# Patient Record
Sex: FEMALE | Race: BLACK OR AFRICAN AMERICAN | NOT HISPANIC OR LATINO | URBAN - METROPOLITAN AREA
[De-identification: names, ages, dates, MRNs, and addresses within clinical notes are randomized per-mention and may not be internally consistent; named-entity substitution may affect disease eponyms.]

---

## 2019-05-31 ENCOUNTER — OUTPATIENT (OUTPATIENT)
Dept: EMERGENCY DEPT | Facility: HOSPITAL | Age: 54
LOS: 1 days | End: 2019-05-31

## 2019-05-31 VITALS
OXYGEN SATURATION: 97 % | TEMPERATURE: 98 F | HEART RATE: 67 BPM | DIASTOLIC BLOOD PRESSURE: 82 MMHG | RESPIRATION RATE: 16 BRPM | SYSTOLIC BLOOD PRESSURE: 155 MMHG

## 2019-05-31 DIAGNOSIS — N28.89 OTHER SPECIFIED DISORDERS OF KIDNEY AND URETER: ICD-10-CM

## 2019-05-31 DIAGNOSIS — Z98.51 TUBAL LIGATION STATUS: Chronic | ICD-10-CM

## 2019-05-31 DIAGNOSIS — R91.8 OTHER NONSPECIFIC ABNORMAL FINDING OF LUNG FIELD: ICD-10-CM

## 2019-05-31 DIAGNOSIS — I82.220 ACUTE EMBOLISM AND THROMBOSIS OF INFERIOR VENA CAVA: ICD-10-CM

## 2019-05-31 DIAGNOSIS — J45.909 UNSPECIFIED ASTHMA, UNCOMPLICATED: ICD-10-CM

## 2019-05-31 DIAGNOSIS — Z01.818 ENCOUNTER FOR OTHER PREPROCEDURAL EXAMINATION: ICD-10-CM

## 2019-05-31 DIAGNOSIS — R11.2 NAUSEA WITH VOMITING, UNSPECIFIED: ICD-10-CM

## 2019-05-31 LAB
ALBUMIN SERPL ELPH-MCNC: 3.7 G/DL — SIGNIFICANT CHANGE UP (ref 3.3–5)
ALP SERPL-CCNC: 81 U/L — SIGNIFICANT CHANGE UP (ref 40–120)
ALT FLD-CCNC: < 4 U/L — LOW (ref 4–33)
ANION GAP SERPL CALC-SCNC: 10 MMO/L — SIGNIFICANT CHANGE UP (ref 7–14)
ANION GAP SERPL CALC-SCNC: 13 MMO/L — SIGNIFICANT CHANGE UP (ref 7–14)
APPEARANCE UR: CLEAR — SIGNIFICANT CHANGE UP
APTT BLD: 32.6 SEC — SIGNIFICANT CHANGE UP (ref 27.5–36.3)
AST SERPL-CCNC: 5 U/L — SIGNIFICANT CHANGE UP (ref 4–32)
BACTERIA # UR AUTO: NEGATIVE — SIGNIFICANT CHANGE UP
BASE EXCESS BLDV CALC-SCNC: 2.2 MMOL/L — SIGNIFICANT CHANGE UP
BASOPHILS # BLD AUTO: 0.04 K/UL — SIGNIFICANT CHANGE UP (ref 0–0.2)
BASOPHILS NFR BLD AUTO: 0.4 % — SIGNIFICANT CHANGE UP (ref 0–2)
BILIRUB SERPL-MCNC: 0.3 MG/DL — SIGNIFICANT CHANGE UP (ref 0.2–1.2)
BILIRUB UR-MCNC: NEGATIVE — SIGNIFICANT CHANGE UP
BLD GP AB SCN SERPL QL: NEGATIVE — SIGNIFICANT CHANGE UP
BLOOD GAS VENOUS - CREATININE: 0.78 MG/DL — SIGNIFICANT CHANGE UP (ref 0.5–1.3)
BLOOD GAS VENOUS - FIO2: 21 — SIGNIFICANT CHANGE UP
BLOOD UR QL VISUAL: NEGATIVE — SIGNIFICANT CHANGE UP
BUN SERPL-MCNC: 5 MG/DL — LOW (ref 7–23)
BUN SERPL-MCNC: 8 MG/DL — SIGNIFICANT CHANGE UP (ref 7–23)
CA-I BLD-SCNC: 1.52 MMOL/L — HIGH (ref 1.03–1.23)
CA-I BLD-SCNC: 1.57 MMOL/L — HIGH (ref 1.03–1.23)
CALCIUM SERPL-MCNC: 11.7 MG/DL — HIGH (ref 8.4–10.5)
CALCIUM SERPL-MCNC: 12.4 MG/DL — HIGH (ref 8.4–10.5)
CHLORIDE BLDV-SCNC: 110 MMOL/L — HIGH (ref 96–108)
CHLORIDE SERPL-SCNC: 102 MMOL/L — SIGNIFICANT CHANGE UP (ref 98–107)
CHLORIDE SERPL-SCNC: 107 MMOL/L — SIGNIFICANT CHANGE UP (ref 98–107)
CO2 SERPL-SCNC: 25 MMOL/L — SIGNIFICANT CHANGE UP (ref 22–31)
CO2 SERPL-SCNC: 26 MMOL/L — SIGNIFICANT CHANGE UP (ref 22–31)
COLOR SPEC: YELLOW — SIGNIFICANT CHANGE UP
CREAT SERPL-MCNC: 0.84 MG/DL — SIGNIFICANT CHANGE UP (ref 0.5–1.3)
CREAT SERPL-MCNC: 0.92 MG/DL — SIGNIFICANT CHANGE UP (ref 0.5–1.3)
EOSINOPHIL # BLD AUTO: 0.02 K/UL — SIGNIFICANT CHANGE UP (ref 0–0.5)
EOSINOPHIL NFR BLD AUTO: 0.2 % — SIGNIFICANT CHANGE UP (ref 0–6)
GAS PNL BLDV: 142 MMOL/L — SIGNIFICANT CHANGE UP (ref 136–146)
GLUCOSE BLDC GLUCOMTR-MCNC: 121 MG/DL — HIGH (ref 70–99)
GLUCOSE BLDV-MCNC: 96 MG/DL — SIGNIFICANT CHANGE UP (ref 70–99)
GLUCOSE SERPL-MCNC: 122 MG/DL — HIGH (ref 70–99)
GLUCOSE SERPL-MCNC: 97 MG/DL — SIGNIFICANT CHANGE UP (ref 70–99)
GLUCOSE UR-MCNC: NEGATIVE — SIGNIFICANT CHANGE UP
HCO3 BLDV-SCNC: 26 MMOL/L — SIGNIFICANT CHANGE UP (ref 20–27)
HCT VFR BLD CALC: 32.6 % — LOW (ref 34.5–45)
HCT VFR BLDV CALC: 34.6 % — SIGNIFICANT CHANGE UP (ref 34.5–45)
HGB BLD-MCNC: 11 G/DL — LOW (ref 11.5–15.5)
HGB BLDV-MCNC: 11.2 G/DL — LOW (ref 11.5–15.5)
HYALINE CASTS # UR AUTO: NEGATIVE — SIGNIFICANT CHANGE UP
IMM GRANULOCYTES NFR BLD AUTO: 0.3 % — SIGNIFICANT CHANGE UP (ref 0–1.5)
INR BLD: 1.14 — SIGNIFICANT CHANGE UP (ref 0.88–1.17)
KETONES UR-MCNC: NEGATIVE — SIGNIFICANT CHANGE UP
LACTATE BLDV-MCNC: 1.3 MMOL/L — SIGNIFICANT CHANGE UP (ref 0.5–2)
LEUKOCYTE ESTERASE UR-ACNC: NEGATIVE — SIGNIFICANT CHANGE UP
LIDOCAIN IGE QN: 14.2 U/L — SIGNIFICANT CHANGE UP (ref 7–60)
LYMPHOCYTES # BLD AUTO: 1.89 K/UL — SIGNIFICANT CHANGE UP (ref 1–3.3)
LYMPHOCYTES # BLD AUTO: 20.9 % — SIGNIFICANT CHANGE UP (ref 13–44)
MAGNESIUM SERPL-MCNC: 1.9 MG/DL — SIGNIFICANT CHANGE UP (ref 1.6–2.6)
MCHC RBC-ENTMCNC: 32.8 PG — SIGNIFICANT CHANGE UP (ref 27–34)
MCHC RBC-ENTMCNC: 33.7 % — SIGNIFICANT CHANGE UP (ref 32–36)
MCV RBC AUTO: 97.3 FL — SIGNIFICANT CHANGE UP (ref 80–100)
MONOCYTES # BLD AUTO: 0.62 K/UL — SIGNIFICANT CHANGE UP (ref 0–0.9)
MONOCYTES NFR BLD AUTO: 6.9 % — SIGNIFICANT CHANGE UP (ref 2–14)
NEUTROPHILS # BLD AUTO: 6.44 K/UL — SIGNIFICANT CHANGE UP (ref 1.8–7.4)
NEUTROPHILS NFR BLD AUTO: 71.3 % — SIGNIFICANT CHANGE UP (ref 43–77)
NITRITE UR-MCNC: NEGATIVE — SIGNIFICANT CHANGE UP
NRBC # FLD: 0 K/UL — SIGNIFICANT CHANGE UP (ref 0–0)
PCO2 BLDV: 38 MMHG — LOW (ref 41–51)
PH BLDV: 7.45 PH — HIGH (ref 7.32–7.43)
PH UR: 6.5 — SIGNIFICANT CHANGE UP (ref 5–8)
PLATELET # BLD AUTO: 424 K/UL — HIGH (ref 150–400)
PMV BLD: 10.2 FL — SIGNIFICANT CHANGE UP (ref 7–13)
PO2 BLDV: 64 MMHG — HIGH (ref 35–40)
POTASSIUM BLDV-SCNC: 3 MMOL/L — LOW (ref 3.4–4.5)
POTASSIUM SERPL-MCNC: 3 MMOL/L — LOW (ref 3.5–5.3)
POTASSIUM SERPL-MCNC: 3.2 MMOL/L — LOW (ref 3.5–5.3)
POTASSIUM SERPL-SCNC: 3 MMOL/L — LOW (ref 3.5–5.3)
POTASSIUM SERPL-SCNC: 3.2 MMOL/L — LOW (ref 3.5–5.3)
PROT SERPL-MCNC: 6.6 G/DL — SIGNIFICANT CHANGE UP (ref 6–8.3)
PROT UR-MCNC: 30 — SIGNIFICANT CHANGE UP
PROTHROM AB SERPL-ACNC: 13.1 SEC — SIGNIFICANT CHANGE UP (ref 9.8–13.1)
RBC # BLD: 3.35 M/UL — LOW (ref 3.8–5.2)
RBC # FLD: 14 % — SIGNIFICANT CHANGE UP (ref 10.3–14.5)
RBC CASTS # UR COMP ASSIST: SIGNIFICANT CHANGE UP (ref 0–?)
RH IG SCN BLD-IMP: POSITIVE — SIGNIFICANT CHANGE UP
SAO2 % BLDV: 93.1 % — HIGH (ref 60–85)
SODIUM SERPL-SCNC: 141 MMOL/L — SIGNIFICANT CHANGE UP (ref 135–145)
SODIUM SERPL-SCNC: 142 MMOL/L — SIGNIFICANT CHANGE UP (ref 135–145)
SP GR SPEC: 1.03 — SIGNIFICANT CHANGE UP (ref 1–1.04)
SQUAMOUS # UR AUTO: SIGNIFICANT CHANGE UP
UROBILINOGEN FLD QL: SIGNIFICANT CHANGE UP
WBC # BLD: 9.04 K/UL — SIGNIFICANT CHANGE UP (ref 3.8–10.5)
WBC # FLD AUTO: 9.04 K/UL — SIGNIFICANT CHANGE UP (ref 3.8–10.5)
WBC UR QL: SIGNIFICANT CHANGE UP (ref 0–?)

## 2019-05-31 RX ORDER — NICOTINE POLACRILEX 2 MG
1 GUM BUCCAL DAILY
Refills: 0 | Status: DISCONTINUED | OUTPATIENT
Start: 2019-05-31 | End: 2019-06-01

## 2019-05-31 RX ORDER — ONDANSETRON 8 MG/1
4 TABLET, FILM COATED ORAL EVERY 6 HOURS
Refills: 0 | Status: DISCONTINUED | OUTPATIENT
Start: 2019-05-31 | End: 2019-06-01

## 2019-05-31 RX ORDER — SODIUM CHLORIDE 9 MG/ML
1000 INJECTION, SOLUTION INTRAVENOUS
Refills: 0 | Status: DISCONTINUED | OUTPATIENT
Start: 2019-05-31 | End: 2019-06-01

## 2019-05-31 RX ORDER — ACETAMINOPHEN 500 MG
650 TABLET ORAL EVERY 6 HOURS
Refills: 0 | Status: DISCONTINUED | OUTPATIENT
Start: 2019-05-31 | End: 2019-06-01

## 2019-05-31 RX ORDER — POTASSIUM CHLORIDE 20 MEQ
10 PACKET (EA) ORAL
Refills: 0 | Status: COMPLETED | OUTPATIENT
Start: 2019-05-31 | End: 2019-05-31

## 2019-05-31 RX ORDER — HEPARIN SODIUM 5000 [USP'U]/ML
5000 INJECTION INTRAVENOUS; SUBCUTANEOUS EVERY 8 HOURS
Refills: 0 | Status: DISCONTINUED | OUTPATIENT
Start: 2019-05-31 | End: 2019-06-01

## 2019-05-31 RX ORDER — MAGNESIUM SULFATE 500 MG/ML
2 VIAL (ML) INJECTION ONCE
Refills: 0 | Status: COMPLETED | OUTPATIENT
Start: 2019-05-31 | End: 2019-05-31

## 2019-05-31 RX ORDER — ALBUTEROL 90 UG/1
2 AEROSOL, METERED ORAL EVERY 6 HOURS
Refills: 0 | Status: DISCONTINUED | OUTPATIENT
Start: 2019-05-31 | End: 2019-06-01

## 2019-05-31 RX ORDER — I.V. FAT EMULSION 20 G/100ML
6.6 EMULSION INTRAVENOUS
Qty: 16 | Refills: 0 | Status: DISCONTINUED | OUTPATIENT
Start: 2019-05-31 | End: 2019-06-01

## 2019-05-31 RX ORDER — ELECTROLYTE SOLUTION,INJ
1 VIAL (ML) INTRAVENOUS
Refills: 0 | Status: DISCONTINUED | OUTPATIENT
Start: 2019-05-31 | End: 2019-06-01

## 2019-05-31 RX ADMIN — Medication 50 GRAM(S): at 07:40

## 2019-05-31 RX ADMIN — HEPARIN SODIUM 5000 UNIT(S): 5000 INJECTION INTRAVENOUS; SUBCUTANEOUS at 06:46

## 2019-05-31 RX ADMIN — HEPARIN SODIUM 5000 UNIT(S): 5000 INJECTION INTRAVENOUS; SUBCUTANEOUS at 14:08

## 2019-05-31 RX ADMIN — Medication 100 MILLIEQUIVALENT(S): at 09:26

## 2019-05-31 RX ADMIN — Medication 100 MILLIEQUIVALENT(S): at 11:13

## 2019-05-31 RX ADMIN — Medication 1 EACH: at 17:52

## 2019-05-31 RX ADMIN — ONDANSETRON 4 MILLIGRAM(S): 8 TABLET, FILM COATED ORAL at 09:21

## 2019-05-31 RX ADMIN — I.V. FAT EMULSION 6.6 ML/HR: 20 EMULSION INTRAVENOUS at 17:53

## 2019-05-31 RX ADMIN — SODIUM CHLORIDE 75 MILLILITER(S): 9 INJECTION, SOLUTION INTRAVENOUS at 22:53

## 2019-05-31 RX ADMIN — SODIUM CHLORIDE 75 MILLILITER(S): 9 INJECTION, SOLUTION INTRAVENOUS at 06:44

## 2019-05-31 RX ADMIN — SODIUM CHLORIDE 75 MILLILITER(S): 9 INJECTION, SOLUTION INTRAVENOUS at 11:14

## 2019-05-31 RX ADMIN — SODIUM CHLORIDE 75 MILLILITER(S): 9 INJECTION, SOLUTION INTRAVENOUS at 07:39

## 2019-05-31 RX ADMIN — HEPARIN SODIUM 5000 UNIT(S): 5000 INJECTION INTRAVENOUS; SUBCUTANEOUS at 22:52

## 2019-05-31 RX ADMIN — Medication 100 MILLIEQUIVALENT(S): at 14:13

## 2019-05-31 NOTE — ED ADULT TRIAGE NOTE - TEMPERATURE IN CELSIUS (DEGREES C)
"  [facility] does not have a PEDIATRIC EMERGENCY ROOM, PEDIATRIC UNIT OR  PEDIATRIC INTENSIVE CARE UNIT.   "Your feedback is important to us. If you should receive a survey in the next few days, please share your experience with us."     " 36.8

## 2019-05-31 NOTE — H&P ADULT - NSICDXPASTMEDICALHX_GEN_ALL_CORE_FT
PAST MEDICAL HISTORY:  Asthma     [place] (hard of hearing) uses hearing aides bilaterally PAST MEDICAL HISTORY:  Asthma     [place] (hard of hearing) uses hearing aides bilaterally

## 2019-05-31 NOTE — ED PROVIDER NOTE - ATTENDING CONTRIBUTION TO CARE
Reginald: 55 yo female with a h/o asthma transferred from OSH for likely new renal malignancy with vascular involvement. Pt initially presented with abdominal pain, nausea and NBNB vomiting with unintentional recent weight loss. Pt currently feeling much better but new malignancy with mets seen on CT and so pt was transferred for urology evaluation. Pt denies dysuria, hematuria and flank pain. Exam: GENERAL: well appearing, NAD, HEENT: MMM, PERRLA, CARDIO: +S1/S2, no murmurs, rubs or gallops, LUNGS: CTA B/L, no wheezing, rales or rhonchi, MSK: No CVA TTP ABD: soft, nontender, BSx4 quadrants, no guarding or rigidity. EXT: No LE edema NEURO: AxOx3, SKIN: no rashes or lesions, well perfused A/P- 55 yo female with new renal malignancy and mets. PT currently comfortable with no complaints. abdomen non tender. will obtain labs, consult urology and likely admit.

## 2019-05-31 NOTE — CONSULT NOTE ADULT - SUBJECTIVE AND OBJECTIVE BOX
Ashvin Lucio Jr, MD  page [phone]  HPI:  This is a 54 year old female with a medical history significant for asthma, and active heavy smoking, presenting as a transfer from [facility] for findings of 9cm right renal mass.  Patient reports she has been having epigastric pain, a poor appetite, and nausea and vomiting for at least 3 months.  She reports she cannot tolerate anything po, vomits up anything she ingests, denies hematemesis.  She reports she has lost nearly 100lbs within this time period as well suggesting that she was over 250lbs prior and is now around 140lbs.  She reports weakness and fatigue as well.  She presented to [facility] for increased vomiting, epigastric pain and resultant weakness on .  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  Denies fevers/chills.  Denies hematuria, increase in urgency/ frequency, dysuria, denies retention.  Denies chest pain.  Reports occasional shortness of breath most often associated with vomiting.  Denies pain or nausea currently. (31 May 2019 04:35)    Naseous with vomiting after breakfast today.  Zofran relieved nausea and she has been drinking tea.  Pt denies chest pain.  She has recently started using her brother's albuterol inhaler after not requiring medication for asthma since age 22.  So current wheezing or SOB.     PAST MEDICAL & SURGICAL HISTORY:  [place] (hard of hearing): uses hearing aides bilaterally  Asthma  History of tubal ligation      Review of Systems:   CONSTITUTIONAL: No fever, + weight loss  EYES: No eye pain, visual disturbances, or discharge  ENMT:  No difficulty hearing, tinnitus, vertigo; No sinus or throat pain  NECK: No pain or stiffness  BREASTS: No pain, masses, or nipple discharge  RESPIRATORY: No cough, wheezing, chills or hemoptysis; No shortness of breath  CARDIOVASCULAR: No chest pain, palpitations, dizziness, or leg swelling  GASTROINTESTINAL: see HPI  GENITOURINARY: No dysuria, frequency, hematuria, or incontinence  NEUROLOGICAL: No headaches, memory loss, loss of strength, numbness, or tremors  SKIN: No itching, burning, rashes, or lesions   LYMPH NODES: No enlarged glands  ENDOCRINE: No heat or cold intolerance; No hair loss  MUSCULOSKELETAL: No joint pain or swelling; No muscle, back, or extremity pain  PSYCHIATRIC: No depression, anxiety, mood swings, or difficulty sleeping  HEME/LYMPH: No easy bruising, or bleeding gums  ALLERY AND IMMUNOLOGIC: No hives or eczema    Allergies    No Known Allergies    Intolerances        Social History: current smoker 1.5 PPD x 36y, occ etoh denies idu    FAMILY HISTORY:  FH: ovarian cancer: Aunt    MEDICATIONS  (STANDING):  heparin  Injectable 5000 Unit(s) SubCutaneous every 8 hours  lactated ringers. 1000 milliLiter(s) (75 mL/Hr) IV Continuous <Continuous>  nicotine - 21 mG/24Hr(s) Patch 1 patch Transdermal daily  potassium chloride  10 mEq/100 mL IVPB 10 milliEquivalent(s) IV Intermittent every 1 hour    MEDICATIONS  (PRN):  acetaminophen   Tablet .. 650 milliGRAM(s) Oral every 6 hours PRN Temp greater or equal to 38C (100.4F), Mild Pain (1 - 3)  ondansetron Injectable 4 milliGRAM(s) IV Push every 6 hours PRN Nausea and/or Vomiting      Vital Signs Last 24 Hrs  T(C): 36.8 (31 May 2019 07:01), Max: 36.9 (31 May 2019 04:13)  T(F): 98.3 (31 May 2019 07:01), Max: 98.4 (31 May 2019 04:13)  HR: 55 (31 May 2019 07:01) (55 - 70)  BP: 147/89 (31 May 2019 07:01) (142/93 - 155/82)  BP(mean): --  RR: 16 (31 May 2019 07:01) (16 - 16)  SpO2: 100% (31 May 2019 07:01) (97% - 100%)  CAPILLARY BLOOD GLUCOSE            PHYSICAL EXAM:  GENERAL: NAD, thin  HEAD:  Atraumatic, Normocephalic  EYES: EOMI, PERRLA, conjunctiva and sclera clear  NECK: Supple, No JVD  CHEST/LUNG: Clear to auscultation bilaterally; No wheeze  HEART: Regular rate and rhythm; No murmurs, rubs, or gallops  ABDOMEN: Soft, Nontender, Nondistended; Bowel sounds present  EXTREMITIES:  2+ Peripheral Pulses, No clubbing, cyanosis, or edema  PSYCH: AAOx3  NEUROLOGY: non-focal, [place] bilaterally  SKIN: No rashes or lesions    LABS:                        11.0   9.04  )-----------( 424      ( 31 May 2019 04:00 )             32.6         142  |  107  |  5<L>  ----------------------------<  97  3.0<L>   |  25  |  0.84    Ca    11.7<H>      31 May 2019 04:00    TPro  6.6  /  Alb  3.7  /  TBili  0.3  /  DBili  x   /  AST  5   /  ALT  < 4<L>  /  AlkPhos  81      PT/INR - ( 31 May 2019 04:00 )   PT: 13.1 SEC;   INR: 1.14          PTT - ( 31 May 2019 04:00 )  PTT:32.6 SEC      Urinalysis Basic - ( 31 May 2019 05:30 )    Color: YELLOW / Appearance: CLEAR / S.035 / pH: 6.5  Gluc: NEGATIVE / Ketone: NEGATIVE  / Bili: NEGATIVE / Urobili: TRACE   Blood: NEGATIVE / Protein: 30 / Nitrite: NEGATIVE   Leuk Esterase: NEGATIVE / RBC: 0-2 / WBC 0-2   Sq Epi: FEW / Non Sq Epi: x / Bacteria: NEGATIVE        RADIOLOGY & ADDITIONAL TESTS:            Care Discussed with Consultants/Other Providers: urology PA Ashvin Lucio Jr, MD  page [phone]  HPI:  This is a 54 year old female with a medical history significant for asthma, and active heavy smoking, presenting as a transfer from [facility] for findings of 9cm right renal mass.  Patient reports she has been having epigastric pain, a poor appetite, and nausea and vomiting for at least 3 months.  She reports she cannot tolerate anything po, vomits up anything she ingests, denies hematemesis.  She reports she has lost nearly 100lbs within this time period as well suggesting that she was over 250lbs prior and is now around 140lbs.  She reports weakness and fatigue as well.  She presented to [facility] for increased vomiting, epigastric pain and resultant weakness on .  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  Denies fevers/chills.  Denies hematuria, increase in urgency/ frequency, dysuria, denies retention.  Denies chest pain.  Reports occasional shortness of breath most often associated with vomiting.  Denies pain or nausea currently. (31 May 2019 04:35)    Naseous with vomiting after breakfast today.  Zofran relieved nausea and she has been drinking tea.  Pt denies chest pain.  She has recently started using her brother's albuterol inhaler after not requiring medication for asthma since age 22.  So current wheezing or SOB.     PAST MEDICAL & SURGICAL HISTORY:  [place] (hard of hearing): uses hearing aides bilaterally  Asthma  History of tubal ligation      Review of Systems:   CONSTITUTIONAL: No fever, + weight loss  EYES: No eye pain, visual disturbances, or discharge  ENMT:  No difficulty hearing, tinnitus, vertigo; No sinus or throat pain  NECK: No pain or stiffness  BREASTS: No pain, masses, or nipple discharge  RESPIRATORY: No cough, wheezing, chills or hemoptysis; No shortness of breath  CARDIOVASCULAR: No chest pain, palpitations, dizziness, or leg swelling  GASTROINTESTINAL: see HPI  GENITOURINARY: No dysuria, frequency, hematuria, or incontinence  NEUROLOGICAL: No headaches, memory loss, loss of strength, numbness, or tremors  SKIN: No itching, burning, rashes, or lesions   LYMPH NODES: No enlarged glands  ENDOCRINE: No heat or cold intolerance; No hair loss  MUSCULOSKELETAL: No joint pain or swelling; No muscle, back, or extremity pain  PSYCHIATRIC: No depression, anxiety, mood swings, or difficulty sleeping  HEME/LYMPH: No easy bruising, or bleeding gums  ALLERY AND IMMUNOLOGIC: No hives or eczema    Allergies    No Known Allergies    Intolerances        Social History: current smoker 1.5 PPD x 36y, occ etoh denies idu    FAMILY HISTORY:  FH: ovarian cancer: Aunt    MEDICATIONS  (STANDING):  heparin  Injectable 5000 Unit(s) SubCutaneous every 8 hours  lactated ringers. 1000 milliLiter(s) (75 mL/Hr) IV Continuous <Continuous>  nicotine - 21 mG/24Hr(s) Patch 1 patch Transdermal daily  potassium chloride  10 mEq/100 mL IVPB 10 milliEquivalent(s) IV Intermittent every 1 hour    MEDICATIONS  (PRN):  acetaminophen   Tablet .. 650 milliGRAM(s) Oral every 6 hours PRN Temp greater or equal to 38C (100.4F), Mild Pain (1 - 3)  ondansetron Injectable 4 milliGRAM(s) IV Push every 6 hours PRN Nausea and/or Vomiting      Vital Signs Last 24 Hrs  T(C): 36.8 (31 May 2019 07:01), Max: 36.9 (31 May 2019 04:13)  T(F): 98.3 (31 May 2019 07:01), Max: 98.4 (31 May 2019 04:13)  HR: 55 (31 May 2019 07:01) (55 - 70)  BP: 147/89 (31 May 2019 07:01) (142/93 - 155/82)  BP(mean): --  RR: 16 (31 May 2019 07:01) (16 - 16)  SpO2: 100% (31 May 2019 07:01) (97% - 100%)  CAPILLARY BLOOD GLUCOSE            PHYSICAL EXAM:  GENERAL: NAD, thin  HEAD:  Atraumatic, Normocephalic  EYES: EOMI, PERRLA, conjunctiva and sclera clear  NECK: Supple, No JVD  CHEST/LUNG: Clear to auscultation bilaterally; No wheeze  HEART: Regular rate and rhythm; No murmurs, rubs, or gallops  ABDOMEN: Soft, Nontender, Nondistended; Bowel sounds present  EXTREMITIES:  2+ Peripheral Pulses, No clubbing, cyanosis, or edema  PSYCH: AAOx3  NEUROLOGY: non-focal, [place] bilaterally  SKIN: No rashes or lesions    LABS:                        11.0   9.04  )-----------( 424      ( 31 May 2019 04:00 )             32.6         142  |  107  |  5<L>  ----------------------------<  97  3.0<L>   |  25  |  0.84    Ca    11.7<H>      31 May 2019 04:00    TPro  6.6  /  Alb  3.7  /  TBili  0.3  /  DBili  x   /  AST  5   /  ALT  < 4<L>  /  AlkPhos  81      PT/INR - ( 31 May 2019 04:00 )   PT: 13.1 SEC;   INR: 1.14          PTT - ( 31 May 2019 04:00 )  PTT:32.6 SEC      Urinalysis Basic - ( 31 May 2019 05:30 )    Color: YELLOW / Appearance: CLEAR / S.035 / pH: 6.5  Gluc: NEGATIVE / Ketone: NEGATIVE  / Bili: NEGATIVE / Urobili: TRACE   Blood: NEGATIVE / Protein: 30 / Nitrite: NEGATIVE   Leuk Esterase: NEGATIVE / RBC: 0-2 / WBC 0-2   Sq Epi: FEW / Non Sq Epi: x / Bacteria: NEGATIVE    EKG: NSR 60 RBBB    RADIOLOGY & ADDITIONAL TESTS:    CT A/P from OSH report reviewed: 9cm R renal mass c/w RCC; nodules in RML and LLL up to 15mm c/w pulm mets; tumor thrombus extending in IVC at least to level of intrahepatic IVC        Care Discussed with Consultants/Other Providers: urology PA

## 2019-05-31 NOTE — ED PROVIDER NOTE - CLINICAL SUMMARY MEDICAL DECISION MAKING FREE TEXT BOX
Pt with new RCC with metastasis. Accepted by urology, will call them. Repeat basic labs and preops. Reassess, likely admit.

## 2019-05-31 NOTE — CONSULT NOTE ADULT - PROBLEM SELECTOR RECOMMENDATION 9
Management per urology; plan is transfer to [facility] for CT surgery involvement due to tumor thrombus.  Suspected pulmonary mets

## 2019-05-31 NOTE — ED ADULT NURSE NOTE - CHIEF COMPLAINT QUOTE
Pt arrives to ED via EMS as transfer from [facility].  Pt seen at [facility] for N/V lasting 3 months.  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  Pt currently denies pain.  Pt received 2 grams of Rocephin, 4mg of Zofran and 1 Liter of NS.  Pt has 20g to RT AC.  Hx of smoking, Asthma and HTN.

## 2019-05-31 NOTE — CONSULT NOTE ADULT - ASSESSMENT
Renal mass with IVC tumor thrombus   preOp      Based on current ACC/AHA guidelines, patient history and physical exam, the patient is considered to have low risk based on history   given IVC thrombus needs ERICA and CTS eval

## 2019-05-31 NOTE — H&P ADULT - NSHPSOCIALHISTORY_GEN_ALL_CORE
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

## 2019-05-31 NOTE — CONSULT NOTE ADULT - SUBJECTIVE AND OBJECTIVE BOX
Patient is a 54y old  Female who presents with a chief complaint of renal mass (31 May 2019 11:22)    HPI:  This is a 54 year old female with a medical history significant for asthma, and active heavy smoking, presenting as a transfer from [facility] for findings of 9cm right renal mass.  Patient reports she has been having epigastric pain, a poor appetite, and nausea and vomiting for at least 3 months.  She reports she cannot tolerate anything po, vomits up anything she ingests, denies hematemesis.  She reports she has lost nearly 100lbs within this time period as well suggesting that she was over 250lbs prior and is now around 140lbs.  She reports weakness and fatigue as well.  She presented to [facility] for increased vomiting, epigastric pain and resultant weakness on .  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  Denies fevers/chills.  Denies hematuria, increase in urgency/ frequency, dysuria, denies retention.  Denies chest pain.  Reports occasional shortness of breath most often associated with vomiting.  Denies pain or nausea currently. (31 May 2019 04:35)    Dental team consulted for loose teeth and dental evaluation prior to anesthesia.   Patient denies any pain, discomfort, trouble breathing or swelling.       PAST MEDICAL & SURGICAL HISTORY:  [place] (hard of hearing): uses hearing aides bilaterally  Asthma  History of tubal ligation    MEDICATIONS  (STANDING):  fat emulsion (Fish Oil and Plant Based) 20% Infusion 6.6 mL/Hr (6.6 mL/Hr) IV Continuous <Continuous>  heparin  Injectable 5000 Unit(s) SubCutaneous every 8 hours  lactated ringers. 1000 milliLiter(s) (75 mL/Hr) IV Continuous <Continuous>  nicotine - 21 mG/24Hr(s) Patch 1 patch Transdermal daily  Parenteral Nutrition - Adult 1 Each (42 mL/Hr) TPN Continuous <Continuous>  potassium chloride  10 mEq/100 mL IVPB 10 milliEquivalent(s) IV Intermittent every 1 hour    MEDICATIONS  (PRN):  acetaminophen   Tablet .. 650 milliGRAM(s) Oral every 6 hours PRN Temp greater or equal to 38C (100.4F), Mild Pain (1 - 3)  ondansetron Injectable 4 milliGRAM(s) IV Push every 6 hours PRN Nausea and/or Vomiting    Allergies  No Known Allergies    FAMILY HISTORY:  FH: ovarian cancer: Aunt    Vital Signs Last 24 Hrs  T(C): 36.8 (31 May 2019 07:01), Max: 36.9 (31 May 2019 04:13)  T(F): 98.3 (31 May 2019 07:01), Max: 98.4 (31 May 2019 04:13)  HR: 55 (31 May 2019 07:01) (55 - 70)  BP: 147/89 (31 May 2019 07:01) (142/93 - 155/82)  BP(mean): --  RR: 16 (31 May 2019 07:01) (16 - 16)  SpO2: 100% (31 May 2019 07:) (97% - 100%)    EOE:  TMJ ( - ) clicks                    ( -  ) pops                    ( -  ) crepitus             Mandible FROM             Facial bones and MOM grossly intact             ( -  ) trismus             ( -  ) LAD             ( -  ) swelling             ( -  ) asymmetry             ( -  ) palpation    IOE:  permanent dentition: multiple carious teeth           tongue/FOM No pathology noted           labial/buccal mucosa No pathology noted           ( -  ) percussion           ( -  ) palpation           ( -  ) swelling    Generalized poor dentition. Severe calculus accumulation.   Generalized retained root tips.   Clinical crowns present teeth 5,23,24, 25 and 28. Gross caries present.     Teeth 23, 24, 25 display class II mobility  Teeth 5 and 29 display class I mobility    Radiographs: No radiographs taken.     LABS:                        11.0   9.04  )-----------( 424      ( 31 May 2019 04:00 )             32.6         142  |  107  |  5<L>  ----------------------------<  97  3.0<L>   |  25  |  0.84    Ca    11.7<H>      31 May 2019 04:00    TPro  6.6  /  Alb  3.7  /  TBili  0.3  /  DBili  x   /  AST  5   /  ALT  < 4<L>  /  AlkPhos  81      WBC Count: 9.04 K/uL [3.8 - 10.5] ( @ 04:00)    Platelet Count - Automated: 424 K/uL <H> [150 - 400] ( @ 04:00)    INR: 1.14 [0.88 - 1.17] ( @ 04:00)    Urinalysis Basic - ( 31 May 2019 05:30 )    Color: YELLOW / Appearance: CLEAR / S.035 / pH: 6.5  Gluc: NEGATIVE / Ketone: NEGATIVE  / Bili: NEGATIVE / Urobili: TRACE   Blood: NEGATIVE / Protein: 30 / Nitrite: NEGATIVE   Leuk Esterase: NEGATIVE / RBC: 0-2 / WBC 0-2   Sq Epi: FEW / Non Sq Epi: x / Bacteria: NEGATIVE      ASSESSMENT:  Teeth 23, 24, 25 display class II mobility  Teeth 5 and 29 display class I mobility  No aspiration risks at this time. No additional treatment recommended.    PROCEDURE:  Bedside EOE and IOE    RECOMMENDATIONS:   1) Please take care and do not place excessive force on teeth during intubation. No aspiration risks at this time. No additional treatment recommended.  2) Dental F/U with outpatient dentist for comprehensive dental care.   3) If any difficulty swallowing/breathing, fever occur, page dental.     Deb Baires DDS  Pager [phone]

## 2019-05-31 NOTE — H&P ADULT - NSHPLABSRESULTS_GEN_ALL_CORE
Labs ordered and pending    Labs at OSH:  WBC 10, Hgb 13.4, Hct 40.6 Platelet 518  Na 142, K 3.8, Cl 103, HCO3 28, BUN 10, creatinine 0.88 Glucose 110 Ca 13.1    UA: pH 5.5, SG 1.026, protein 100, glucose neg, ketones trace, blood neg, nitrite neg, bilirubin small, leuk est trace, WBC 10-20, squam epithel large, calcium oxalate moderate, bacteria moderate.     CT: 9cm heterogenous mass within the upper mid right kidney, tumor thrombus that extends to the IVC to level of upper intrahepatic IVC, right middle lobe and left lower lobe pulm nodules.  1mm nonobstructing calculus within mid left kidney.

## 2019-05-31 NOTE — ED PROVIDER NOTE - OBJECTIVE STATEMENT
54F h/o asthma presents as a transfer from [facility] for RCC. Pt has had fatigue/weakness and 30-40lb weight loss over last few months. Presented to [facility] for epigastric pain and vomiting x 1 day, CT shows 9cm mass in R kidney with "tumor thrombus" in IVc extending intrahepatic and multiple pulmonary nodules. Pt is currently asymptomatic. Transfer accepted by Dr Ware (). Received ceftriaxone at OSH for UTI.

## 2019-05-31 NOTE — PATIENT PROFILE ADULT - VISION (WITH CORRECTIVE LENSES IF THE PATIENT USUALLY WEARS THEM):
only to read/Partially impaired: cannot see medication labels or newsprint, but can see obstacles in path, and the surrounding layout; can count fingers at arm's length

## 2019-05-31 NOTE — H&P ADULT - ASSESSMENT
54 year old female with a medical history significant for asthma, and active heavy smoking, presenting as a transfer from [facility] for findings of 9cm right renal mass with weakness, fatigue, epigastric pain, nausea/vomiting, and extensive weight loss.  Admitted to urology for workup and management of mass and CT findings.     -Admit to Urology  -Pain control prn  -Antimetics prn  -upload imaging if possible  -followup pending labs/UA/EKG  -supportive care  -Discussed with Dr. Ware 54 year old female with a medical history significant for asthma, and active heavy smoking, presenting as a transfer from [facility] for findings of 9cm right renal mass with weakness, fatigue, epigastric pain, nausea/vomiting, and extensive weight loss.  Admitted to urology for workup and management of mass and CT findings.     -Admit to Urology for workup and management of CT findings   -Pain control prn  -Antimetics prn  -upload imaging if possible  -followup pending labs/UA/EKG  -Monitor po intake, strict I+Os  -supportive care  -Discussed with Dr. Ware 54 year old female with a medical history significant for asthma, and active heavy smoking, presenting as a transfer from [facility] for findings of 9cm right renal mass with weakness, fatigue, epigastric pain, nausea/vomiting, and extensive weight loss.  Admitted to urology for workup and management of mass and CT findings.     -Admit to Urology for workup and management of CT findings   -Pain control prn  -Antimetics prn  -followup pending labs/UA/EKG  -Monitor po intake, strict I+Os  -supportive care  -Discussed with Dr. Ware

## 2019-05-31 NOTE — CONSULT NOTE ADULT - PROBLEM SELECTOR RECOMMENDATION 2
Asthma is mild and intermittent.  Recommend cardiology evaluation given risk of cardiac extension of tumor thrombus.

## 2019-05-31 NOTE — CONSULT NOTE ADULT - SUBJECTIVE AND OBJECTIVE BOX
Nutrition Support Consult Note    HPI:  This is a 54 year old female with a medical history significant for asthma, and active heavy smoking, presenting as a transfer from [facility] for findings of 9cm right renal mass.  Patient reports she has been having epigastric pain, a poor appetite, and nausea and vomiting for at least 3 months.  She reports she cannot tolerate anything po, vomits up anything she ingests, denies hematemesis.  She reports she has lost nearly 100lbs within this time period as well suggesting that she was over 250lbs prior and is now around 140lbs.  She reports weakness and fatigue as well.  She presented to [facility] for increased vomiting, epigastric pain and resultant weakness on 5/30.  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  Denies fevers/chills.  Denies hematuria, increase in urgency/ frequency, dysuria, denies retention.  Denies chest pain.  Reports occasional shortness of breath most often associated with vomiting.  Denies pain or nausea currently. (31 May 2019 04:35)    CT: 9cm heterogenous mass within the upper mid right kidney, tumor thrombus that extends to the IVC to level of upper intrahepatic IVC, right middle lobe and left lower lobe pulm nodules.  1mm nonobstructing calculus within mid left kidney.    Plan to start TPN/lipids today after PICC placement for nutritional support.    Allergies  No Known Allergies    MEDICATIONS  (STANDING):  heparin  Injectable 5000 Unit(s) SubCutaneous every 8 hours  lactated ringers. 1000 milliLiter(s) (75 mL/Hr) IV Continuous <Continuous>  nicotine - 21 mG/24Hr(s) Patch 1 patch Transdermal daily  potassium chloride  10 mEq/100 mL IVPB 10 milliEquivalent(s) IV Intermittent every 1 hour    MEDICATIONS  (PRN):  acetaminophen   Tablet .. 650 milliGRAM(s) Oral every 6 hours PRN Temp greater or equal to 38C (100.4F), Mild Pain (1 - 3)  ondansetron Injectable 4 milliGRAM(s) IV Push every 6 hours PRN Nausea and/or Vomiting    PAST MEDICAL & SURGICAL HISTORY:  [place] (hard of hearing): uses hearing aides bilaterally  Asthma  History of tubal ligation    FAMILY HISTORY:  FH: ovarian cancer: Aunt    Vital Signs Last 24 Hrs  T(C): 36.8 (31 May 2019 07:01), Max: 36.9 (31 May 2019 04:13)  T(F): 98.3 (31 May 2019 07:01), Max: 98.4 (31 May 2019 04:13)  HR: 55 (31 May 2019 07:01) (55 - 70)  BP: 147/89 (31 May 2019 07:01) (142/93 - 155/82)  RR: 16 (31 May 2019 07:01) (16 - 16)  SpO2: 100% (31 May 2019 07:01) (97% - 100%)    MEDICATIONS  (STANDING):  heparin  Injectable 5000 Unit(s) SubCutaneous every 8 hours  lactated ringers. 1000 milliLiter(s) (75 mL/Hr) IV Continuous <Continuous>  nicotine - 21 mG/24Hr(s) Patch 1 patch Transdermal daily  potassium chloride  10 mEq/100 mL IVPB 10 milliEquivalent(s) IV Intermittent every 1 hour    MEDICATIONS  (PRN):  acetaminophen   Tablet .. 650 milliGRAM(s) Oral every 6 hours PRN Temp greater or equal to 38C (100.4F), Mild Pain (1 - 3)  ondansetron Injectable 4 milliGRAM(s) IV Push every 6 hours PRN Nausea and/or Vomiting    I&O's Detail    30 May 2019 07:01  -  31 May 2019 07:00  --------------------------------------------------------  IN:  Total IN: 0 mL    OUT:    Voided: 150 mL  Total OUT: 150 mL    Total NET: -150 mL    PHYSICAL EXAM:  General: NAD, resting comfortably in bed  Pulm: nonlabored respirations  Abdomen: soft, nontender  Extremities: warm, no edema     LABS:                        11.0   9.04  )-----------( 424      ( 31 May 2019 04:00 )             32.6       05-31    142  |  107  |  5<L>  ----------------------------<  97  3.0<L>   |  25  |  0.84    Ca    11.7<H>      31 May 2019 04:00    LIVER FUNCTIONS - ( 31 May 2019 04:00 )  Alb: 3.7 g/dL / Pro: 6.6 g/dL / ALK PHOS: 81 u/L / ALT: < 4 u/L / AST: 5 u/L / GGT: x           TPro  6.6  /  Alb  3.7  /  TBili  0.3  /  DBili  x   /  AST  5   /  ALT  < 4<L>  /  AlkPhos  81  05-31    PT/INR - ( 31 May 2019 04:00 )   PT: 13.1 SEC;   INR: 1.14        PTT - ( 31 May 2019 04:00 )  PTT:32.6 SEC    Weight [x ]loss /[  ]gain: 100 lbs over past 3 months  Current Weight: 60.1 kG  Height: 157.48 cm  Ideal Body Weight: 49 kG  BMI: 24.2  Current Diet: [  ]NPO  Appetite: [ x ]Poor [  ]Adequate [  ]Good    CLINICAL INDICATORS  MALNUTRITION IN CONTEXT OF ACUTE ILLNESS OR INJURY  SEVERE MALNUTRITION  Weight Loss  [  ] >2% in 1 week  [  ] >5% in 1 month  [  ] >7.5% in 3 months    Caloric Intake  [ x ] <50% of nutrition needs >= 5 days    Generalized Edema  Severe Edema    Metabolic Requirements:  The patient will require:  _____25________ kilocalories / kg / day  Diagnosis:  [  ] Mild protein malnutrition  [  ] Moderate protein calorie malnutrition in acute illness/ injury  [ x ] Severe protein calorie malnutrition in acute illness/ injury  [  ] Moderate protein calorie malnutrition in chronic illness  [  ] Severe protein calorie malnutrition in chronic illness    Plan:  [x] Initiate TPN formula after PICC placement, start with 1L infusion volume today, increase to full volume and calories tomorrow   Carbohydrates:__200____grams, Amino Acid:___75___grams  SMOF Lipids:__32_____ grams, Total volume:___2000____mL.  1. Dedicated Central line must be placed for TPN.  2. Strict Intake and Output.  3. Weights three times a week  4. Monitor BMP, Mg+, Ionized Ca++, Phosphorus daily  5. Monitor Triglycerides monthly  6. Pre-albumin weekly.  7. Fingersticks to monitor glucose every 6 hours until stable then may be decreased to twice a day.    Nutrition support pager [phone]

## 2019-05-31 NOTE — CONSULT NOTE ADULT - SUBJECTIVE AND OBJECTIVE BOX
CHIEF COMPLAINT:Patient is a 54y old  Female who presents with a chief complaint of renal mass (31 May 2019 11:22)      HISTORY OF PRESENT ILLNESS:  this is a 54 female with history as below  tobacco smoking   with abd discomfort , nausea   significant wt loss   found to have right kidney mass, ca with   tumor thrombus extending to IVC  planned for operation  no chest pain  no significant sob       PAST MEDICAL & SURGICAL HISTORY:  [place] (hard of hearing): uses hearing aides bilaterally  Asthma  History of tubal ligation          MEDICATIONS:  heparin  Injectable 5000 Unit(s) SubCutaneous every 8 hours      ALBUTerol    90 MICROgram(s) HFA Inhaler 2 Puff(s) Inhalation every 6 hours PRN    acetaminophen   Tablet .. 650 milliGRAM(s) Oral every 6 hours PRN  ondansetron Injectable 4 milliGRAM(s) IV Push every 6 hours PRN        fat emulsion (Fish Oil and Plant Based) 20% Infusion 6.6 mL/Hr IV Continuous <Continuous>  lactated ringers. 1000 milliLiter(s) IV Continuous <Continuous>  Parenteral Nutrition - Adult 1 Each TPN Continuous <Continuous>      FAMILY HISTORY:  FH: ovarian cancer: Aunt      Non-contributory    SOCIAL HISTORY:    No tobacco, drugs or etoh    Allergies    No Known Allergies    Intolerances    	    REVIEW OF SYSTEMS:  as above  The rest of the 14 points ROS reviewed and except above they are unremarkable.        PHYSICAL EXAM:  T(C): 37.1 (05-31-19 @ 14:15), Max: 37.1 (05-31-19 @ 14:15)  HR: 57 (05-31-19 @ 14:15) (55 - 70)  BP: 164/85 (05-31-19 @ 14:15) (142/93 - 164/85)  RR: 16 (05-31-19 @ 14:15) (16 - 16)  SpO2: 100% (05-31-19 @ 14:15) (97% - 100%)  Wt(kg): --  I&O's Summary    30 May 2019 07:01  -  31 May 2019 07:00  --------------------------------------------------------  IN: 0 mL / OUT: 150 mL / NET: -150 mL        JVP: Normal  Neck: supple  Lung: clear   CV: S1 S2 , Murmur:  Abd: soft  Ext: No edema  neuro: Awake / alert  Psych: flat affect  Skin: normal    LABS/DATA:    TELEMETRY: 	    ECG:  	< from: 12 Lead ECG (05.31.19 @ 05:59) >    Diagnosis Line Sinus rhythm with marked sinus arrhythmia  Left axis deviation  Right bundle branch block  Abnormal ECG    < end of copied text >     	  CARDIAC MARKERS:                                      11.0   9.04  )-----------( 424      ( 31 May 2019 04:00 )             32.6     05-31    142  |  107  |  5<L>  ----------------------------<  97  3.0<L>   |  25  |  0.84    Ca    11.7<H>      31 May 2019 04:00    TPro  6.6  /  Alb  3.7  /  TBili  0.3  /  DBili  x   /  AST  5   /  ALT  < 4<L>  /  AlkPhos  81  05-31    proBNP:   Lipid Profile:   HgA1c:   TSH:

## 2019-05-31 NOTE — ED ADULT NURSE REASSESSMENT NOTE - GENERAL PATIENT STATE
daughter at bedside/improvement verbalized/comfortable appearance/family/SO at bedside/smiling/interactive
family/SO at bedside/comfortable appearance

## 2019-05-31 NOTE — ED ADULT NURSE REASSESSMENT NOTE - NS ED NURSE REASSESS COMMENT FT1
vs as per flow sheet charting. as per handoff  rpt pt  "Newly dz with renal ca mets to lung transfer for admission awaits urology consult  and further treatment plans...pt received rocephin, zofran, morphine liter normal saline at 4pm. will cont to assess"

## 2019-05-31 NOTE — CONSULT NOTE ADULT - ASSESSMENT
54F asthma with R renal mass with tumor thrombus extending to IVC, pulmonary nodules, nausea and weight loss

## 2019-05-31 NOTE — ED ADULT NURSE REASSESSMENT NOTE - STATUS
awaiting consult/urology Pa on unit pt to be admitted.
Pt has bed 906 tower awaits transport Rpt to Floor RAMIREZ Chambers

## 2019-05-31 NOTE — CONSULT NOTE ADULT - ATTENDING COMMENTS
This is a 54 year old female with a medical history significant for asthma, and active heavy smoking, presenting as a transfer from [facility] for findings of 9cm right renal mass.  Patient reports she has been having epigastric pain, a poor appetite, and nausea and vomiting for at least 3 months.  She reports she cannot tolerate anything po, vomits up anything she ingests, denies hematemesis.  She reports she has lost nearly 100lbs within this time period as well suggesting that she was over 250lbs prior and is now around 140lbs.  She reports weakness and fatigue as well.  She presented to [facility] for increased vomiting, epigastric pain and resultant weakness on 5/30.  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  Denies fevers/chills.  Denies hematuria, increase in urgency/ frequency, dysuria, denies retention.  Denies chest pain.  Reports occasional shortness of breath most often associated with vomiting.  Denies pain or nausea currently. (31 May 2019 04:35)    CT: 9cm heterogenous mass within the upper mid right kidney, tumor thrombus that extends to the IVC to level of upper intrahepatic IVC, right middle lobe and left lower lobe pulm nodules.  1mm nonobstructing calculus within mid left kidney.    Plan to start TPN/lipids today after PICC placement for nutritional support.  Caloric Intake  [ x ] <50% of nutrition needs >= 5 days    Generalized Edema  Severe Edema    Metabolic Requirements:  The patient will require:  25kilocalories / kg / day  Diagnosis:    [ x ] Severe protein calorie malnutrition in acute illness/ injury  Severe protein calorie malnutrition in acute illness/ injury; secondary to poor appetite, weight loss >5% in 1 month and/or >7.5% in 3 months, caloric Intake <50% of nutrition needs >= 5 days, temporal wasting, severe loss of muscle mass/atrophy and loss of body fat stores.     Plan:  [x] Initiate TPN formula after PICC placement, start with 1L infusion volume today, increase to full volume and calories tomorrow   Carbohydrates:__200____grams, Amino Acid:___75___grams  SMOF Lipids:__32_____ grams, Total volume:___2000____mL.  1. Dedicated Central line must be placed for TPN.  2. Strict Intake and Output.  3. Weights three times a week  4. Monitor BMP, Mg+, Ionized Ca++, Phosphorus daily  5. Monitor Triglycerides monthly  6. Pre-albumin weekly.  7. Fingersticks to monitor glucose every 6 hours until stable then may be decreased to twice a day.  I have seen and examined the patient, reviewed the laboratory and radiologic data and agree with the history, physical assessment and plan.  I reviewed and discussed with all consultants, house staff and PA's.  The note is edited where appropriate. The Nutrition Support Team (NST) discusses on an ongoing basis with the primary team and all consultants, House staff and PA's to have a permanent risk benefit analyses on all decisions.    I spent  45  minutes in total encounter; more than 50% of the visit counseling and coordinating nutrition care while providing diagnoses, assessment, and plan.

## 2019-05-31 NOTE — H&P ADULT - ATTENDING COMMENTS
large right renal mass with tumor thrombus extending into the IVC--appears to be above the level of the diaphragm. Unclear if involving the right artium.   b/l pulmonary nodules visualize on upper cuts of abdominal CT.  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    will obtain dedicated chest CT to evaluate EOD in the chest  ERICA to evaluate tumor thrombus (cardiology consult)    consults from vascular, surg onc, and CT surgery. Patient will most likely require transfer to [facility] for surgical treatment of the renal mass and tumor thrombus. Anticipate high likelihood of requiring cardiac bypass for this case    nutrition consult given large recent weight loss and difficulty with PO intake at this time.

## 2019-05-31 NOTE — ED ADULT NURSE NOTE - OBJECTIVE STATEMENT
Pt aa&ox3 presenting as a transfer from [facility]. Pt states she had been experiencing abdominal pain, loss of appetite and unintentional weight loss over the past few months. Pt states that she went to the hospital today b/c she began vomiting. CT scan done at hospital showed renal carcinoma. Pt transferred here for renal consult. Pt denies pain, nausea, vomiting at this time. MD at bedside. Will monitor.

## 2019-05-31 NOTE — CHART NOTE - NSCHARTNOTEFT_GEN_A_CORE
Pre-Interventional Radiology Procedure Note    54y Female renal carcinoma with IVC thrombus, severely malnourished    Procedure: PICC line for TPN    Diagnosis/Indication: Patient is a 54y old  Female who presents with a chief complaint of renal mass (31 May 2019 11:22)      Interventional Radiology Attending Physician: N/A    Ordering Attending Physician: GOMEZ Ware MD    PAST MEDICAL & SURGICAL HISTORY:  [place] (hard of hearing): uses hearing aids bilaterally  Asthma  History of tubal ligation       CBC Full  -  ( 31 May 2019 04:00 )  WBC Count : 9.04 K/uL  RBC Count : 3.35 M/uL  Hemoglobin : 11.0 g/dL  Hematocrit : 32.6 %  Platelet Count - Automated : 424 K/uL  Mean Cell Volume : 97.3 fL  Mean Cell Hemoglobin : 32.8 pg  Mean Cell Hemoglobin Concentration : 33.7 %  Auto Neutrophil # : 6.44 K/uL  Auto Lymphocyte # : 1.89 K/uL  Auto Monocyte # : 0.62 K/uL  Auto Eosinophil # : 0.02 K/uL  Auto Basophil # : 0.04 K/uL  Auto Neutrophil % : 71.3 %  Auto Lymphocyte % : 20.9 %  Auto Monocyte % : 6.9 %  Auto Eosinophil % : 0.2 %  Auto Basophil % : 0.4 %    05-31    142  |  107  |  5<L>  ----------------------------<  97  3.0<L>   |  25  |  0.84    Ca    11.7<H>      31 May 2019 04:00    TPro  6.6  /  Alb  3.7  /  TBili  0.3  /  DBili  x   /  AST  5   /  ALT  < 4<L>  /  AlkPhos  81  05-31    PT/INR - ( 31 May 2019 04:00 )   PT: 13.1 SEC;   INR: 1.14          PTT - ( 31 May 2019 04:00 )  PTT:32.6 SEC    Family and patient aware  Able to sign consent

## 2019-05-31 NOTE — PATIENT PROFILE ADULT - STATED REASON FOR ADMISSION
Patient states she went to [facility] for N/V. Vomitted at [facility] and was now transferred to [facility] ED for more treatment.

## 2019-05-31 NOTE — H&P ADULT - HISTORY OF PRESENT ILLNESS
This is a 54 year old female with a medical history significant for asthma, and active heavy smoking, presenting as a transfer from [facility] for findings of 9cm right renal mass.  Patient reports she has been having epigastric pain, a poor appetite, and nausea and vomiting for at least 3 months.  She reports she cannot tolerate anything po, vomits up anything she ingests, denies hematemesis.  She reports she has lost nearly 100lbs within this time period as well suggesting that she was over 250lbs prior and is now around 140lbs.  She reports weakness and fatigue as well.  She presented to [facility] for increased vomiting, epigastric pain and resultant weakness on 5/30.  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  Denies fevers/chills.  Denies hematuria, increase in urgency/ frequency, dysuria, denies retention.  Denies chest pain.  Reports occasional shortness of breath most often associated with vomiting.  Denies pain or nausea currently.

## 2019-05-31 NOTE — ED ADULT TRIAGE NOTE - CHIEF COMPLAINT QUOTE
Pt arrives to ED via EMS as transfer from [facility].  Pt seen at [facility] for N/V lasting 3 months.  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  Pt currently denies pain.  Pt received 2 grams of Rocephin, 4mg of Zofran and 1 Liter of NS.  Pt has 20g to RT AC.  Hx of smoking, Asthma and HTN.

## 2019-05-31 NOTE — H&P ADULT - NSHPPHYSICALEXAM_GEN_ALL_CORE
Vital Signs Last 24 Hrs  T(C): 36.9 (31 May 2019 04:13), Max: 36.9 (31 May 2019 04:13)  T(F): 98.4 (31 May 2019 04:13), Max: 98.4 (31 May 2019 04:13)  HR: 70 (31 May 2019 04:13) (67 - 70)  BP: 142/93 (31 May 2019 04:13) (142/93 - 155/82)  BP(mean): --  RR: 16 (31 May 2019 04:13) (16 - 16)  SpO2: 97% (31 May 2019 04:13) (97% - 97%)  GENERAL: very hard of hearing, nontoxic appearing, unkempt, poor dentition, A+O, noacute distress  LUNGS: clear  HEART: S1S2, RRR  GI/: soft, nontender, no CVAT bilaterally  EXTREMITIES: no pain, no edema, full sensation, palpable distal pulses Vital Signs Last 24 Hrs  T(C): 36.9 (31 May 2019 04:13), Max: 36.9 (31 May 2019 04:13)  T(F): 98.4 (31 May 2019 04:13), Max: 98.4 (31 May 2019 04:13)  HR: 70 (31 May 2019 04:13) (67 - 70)  BP: 142/93 (31 May 2019 04:13) (142/93 - 155/82)  BP(mean): --  RR: 16 (31 May 2019 04:13) (16 - 16)  SpO2: 97% (31 May 2019 04:13) (97% - 97%)  GENERAL: very hard of hearing, nontoxic appearing, unkempt, poor dentition, A+O, noacute distress  LUNGS: clear  HEART: S1S2, RRR  GI/: soft, nontender, well healed midline incision, no CVAT bilaterally  EXTREMITIES: no pain, no edema, full sensation, palpable distal pulses

## 2019-06-01 ENCOUNTER — INPATIENT (INPATIENT)
Facility: HOSPITAL | Age: 54
LOS: 10 days | Discharge: HOME | DRG: 656 | End: 2019-06-12
Attending: STUDENT IN AN ORGANIZED HEALTH CARE EDUCATION/TRAINING PROGRAM | Admitting: UROLOGY
Payer: COMMERCIAL

## 2019-06-01 ENCOUNTER — TRANSCRIPTION ENCOUNTER (OUTPATIENT)
Age: 54
End: 2019-06-01

## 2019-06-01 VITALS
OXYGEN SATURATION: 100 % | TEMPERATURE: 99 F | DIASTOLIC BLOOD PRESSURE: 78 MMHG | RESPIRATION RATE: 18 BRPM | HEART RATE: 78 BPM | SYSTOLIC BLOOD PRESSURE: 119 MMHG

## 2019-06-01 VITALS
HEART RATE: 86 BPM | TEMPERATURE: 98 F | OXYGEN SATURATION: 98 % | WEIGHT: 132.06 LBS | RESPIRATION RATE: 18 BRPM | SYSTOLIC BLOOD PRESSURE: 119 MMHG | HEIGHT: 62 IN | DIASTOLIC BLOOD PRESSURE: 74 MMHG

## 2019-06-01 DIAGNOSIS — R22.2 LOCALIZED SWELLING, MASS AND LUMP, TRUNK: ICD-10-CM

## 2019-06-01 DIAGNOSIS — R63.4 ABNORMAL WEIGHT LOSS: ICD-10-CM

## 2019-06-01 DIAGNOSIS — N28.89 OTHER SPECIFIED DISORDERS OF KIDNEY AND URETER: ICD-10-CM

## 2019-06-01 DIAGNOSIS — Z98.51 TUBAL LIGATION STATUS: Chronic | ICD-10-CM

## 2019-06-01 LAB
ANION GAP SERPL CALC-SCNC: 10 MMO/L — SIGNIFICANT CHANGE UP (ref 7–14)
BACTERIA UR CULT: SIGNIFICANT CHANGE UP
BASOPHILS # BLD AUTO: 0.03 K/UL — SIGNIFICANT CHANGE UP (ref 0–0.2)
BASOPHILS NFR BLD AUTO: 0.4 % — SIGNIFICANT CHANGE UP (ref 0–2)
BUN SERPL-MCNC: 10 MG/DL — SIGNIFICANT CHANGE UP (ref 7–23)
CA-I BLD-SCNC: 1.44 MMOL/L — HIGH (ref 1.03–1.23)
CALCIUM SERPL-MCNC: 12 MG/DL — HIGH (ref 8.4–10.5)
CHLORIDE SERPL-SCNC: 104 MMOL/L — SIGNIFICANT CHANGE UP (ref 98–107)
CO2 SERPL-SCNC: 25 MMOL/L — SIGNIFICANT CHANGE UP (ref 22–31)
CREAT SERPL-MCNC: 0.83 MG/DL — SIGNIFICANT CHANGE UP (ref 0.5–1.3)
EOSINOPHIL # BLD AUTO: 0.1 K/UL — SIGNIFICANT CHANGE UP (ref 0–0.5)
EOSINOPHIL NFR BLD AUTO: 1.2 % — SIGNIFICANT CHANGE UP (ref 0–6)
GLUCOSE BLDC GLUCOMTR-MCNC: 107 MG/DL — HIGH (ref 70–99)
GLUCOSE BLDC GLUCOMTR-MCNC: 112 MG/DL — HIGH (ref 70–99)
GLUCOSE SERPL-MCNC: 102 MG/DL — HIGH (ref 70–99)
HCT VFR BLD CALC: 32.5 % — LOW (ref 34.5–45)
HGB BLD-MCNC: 10.7 G/DL — LOW (ref 11.5–15.5)
IMM GRANULOCYTES NFR BLD AUTO: 0.2 % — SIGNIFICANT CHANGE UP (ref 0–1.5)
LYMPHOCYTES # BLD AUTO: 2.51 K/UL — SIGNIFICANT CHANGE UP (ref 1–3.3)
LYMPHOCYTES # BLD AUTO: 30.6 % — SIGNIFICANT CHANGE UP (ref 13–44)
MAGNESIUM SERPL-MCNC: 1.8 MG/DL — SIGNIFICANT CHANGE UP (ref 1.6–2.6)
MCHC RBC-ENTMCNC: 32.3 PG — SIGNIFICANT CHANGE UP (ref 27–34)
MCHC RBC-ENTMCNC: 32.9 % — SIGNIFICANT CHANGE UP (ref 32–36)
MCV RBC AUTO: 98.2 FL — SIGNIFICANT CHANGE UP (ref 80–100)
MONOCYTES # BLD AUTO: 0.64 K/UL — SIGNIFICANT CHANGE UP (ref 0–0.9)
MONOCYTES NFR BLD AUTO: 7.8 % — SIGNIFICANT CHANGE UP (ref 2–14)
NEUTROPHILS # BLD AUTO: 4.9 K/UL — SIGNIFICANT CHANGE UP (ref 1.8–7.4)
NEUTROPHILS NFR BLD AUTO: 59.8 % — SIGNIFICANT CHANGE UP (ref 43–77)
NRBC # FLD: 0 K/UL — SIGNIFICANT CHANGE UP (ref 0–0)
PHOSPHATE SERPL-MCNC: 0.9 MG/DL — CRITICAL LOW (ref 2.5–4.5)
PLATELET # BLD AUTO: 392 K/UL — SIGNIFICANT CHANGE UP (ref 150–400)
PMV BLD: 10.6 FL — SIGNIFICANT CHANGE UP (ref 7–13)
POTASSIUM SERPL-MCNC: 3.5 MMOL/L — SIGNIFICANT CHANGE UP (ref 3.5–5.3)
POTASSIUM SERPL-SCNC: 3.5 MMOL/L — SIGNIFICANT CHANGE UP (ref 3.5–5.3)
PREALB SERPL-MCNC: 17 MG/DL — LOW (ref 20–40)
RBC # BLD: 3.31 M/UL — LOW (ref 3.8–5.2)
RBC # FLD: 14.2 % — SIGNIFICANT CHANGE UP (ref 10.3–14.5)
SODIUM SERPL-SCNC: 139 MMOL/L — SIGNIFICANT CHANGE UP (ref 135–145)
SPECIMEN SOURCE: SIGNIFICANT CHANGE UP
TRIGL SERPL-MCNC: 103 MG/DL — SIGNIFICANT CHANGE UP (ref 10–149)
WBC # BLD: 8.2 K/UL — SIGNIFICANT CHANGE UP (ref 3.8–10.5)
WBC # FLD AUTO: 8.2 K/UL — SIGNIFICANT CHANGE UP (ref 3.8–10.5)

## 2019-06-01 PROCEDURE — ZZZZZ: CPT

## 2019-06-01 PROCEDURE — 99222 1ST HOSP IP/OBS MODERATE 55: CPT

## 2019-06-01 RX ORDER — SODIUM CHLORIDE 9 MG/ML
10 INJECTION INTRAMUSCULAR; INTRAVENOUS; SUBCUTANEOUS
Refills: 0 | Status: DISCONTINUED | OUTPATIENT
Start: 2019-06-01 | End: 2019-06-07

## 2019-06-01 RX ORDER — I.V. FAT EMULSION 20 G/100ML
14.5 EMULSION INTRAVENOUS
Qty: 35 | Refills: 0 | Status: DISCONTINUED | OUTPATIENT
Start: 2019-06-01 | End: 2019-06-01

## 2019-06-01 RX ORDER — SENNA PLUS 8.6 MG/1
2 TABLET ORAL AT BEDTIME
Refills: 0 | Status: DISCONTINUED | OUTPATIENT
Start: 2019-06-01 | End: 2019-06-07

## 2019-06-01 RX ORDER — NICOTINE POLACRILEX 2 MG
1 GUM BUCCAL DAILY
Refills: 0 | Status: DISCONTINUED | OUTPATIENT
Start: 2019-06-01 | End: 2019-06-07

## 2019-06-01 RX ORDER — HEPARIN SODIUM 5000 [USP'U]/ML
5000 INJECTION INTRAVENOUS; SUBCUTANEOUS
Qty: 0 | Refills: 0 | DISCHARGE
Start: 2019-06-01

## 2019-06-01 RX ORDER — ELECTROLYTE SOLUTION,INJ
1 VIAL (ML) INTRAVENOUS
Refills: 0 | Status: DISCONTINUED | OUTPATIENT
Start: 2019-06-01 | End: 2019-06-01

## 2019-06-01 RX ORDER — SENNA PLUS 8.6 MG/1
2 TABLET ORAL
Qty: 0 | Refills: 0 | DISCHARGE
Start: 2019-06-01

## 2019-06-01 RX ORDER — NICOTINE POLACRILEX 2 MG
1 GUM BUCCAL
Qty: 0 | Refills: 0 | DISCHARGE
Start: 2019-06-01

## 2019-06-01 RX ORDER — ACETAMINOPHEN 500 MG
2 TABLET ORAL
Qty: 0 | Refills: 0 | DISCHARGE
Start: 2019-06-01

## 2019-06-01 RX ORDER — CHLORHEXIDINE GLUCONATE 213 G/1000ML
1 SOLUTION TOPICAL
Refills: 0 | Status: DISCONTINUED | OUTPATIENT
Start: 2019-06-01 | End: 2019-06-07

## 2019-06-01 RX ORDER — DOCUSATE SODIUM 100 MG
100 CAPSULE ORAL THREE TIMES A DAY
Refills: 0 | Status: DISCONTINUED | OUTPATIENT
Start: 2019-06-01 | End: 2019-06-01

## 2019-06-01 RX ORDER — ACETAMINOPHEN 500 MG
650 TABLET ORAL EVERY 6 HOURS
Refills: 0 | Status: DISCONTINUED | OUTPATIENT
Start: 2019-06-01 | End: 2019-06-07

## 2019-06-01 RX ORDER — ONDANSETRON 8 MG/1
4 TABLET, FILM COATED ORAL
Qty: 0 | Refills: 0 | DISCHARGE
Start: 2019-06-01

## 2019-06-01 RX ORDER — HEPARIN SODIUM 5000 [USP'U]/ML
5000 INJECTION INTRAVENOUS; SUBCUTANEOUS EVERY 8 HOURS
Refills: 0 | Status: DISCONTINUED | OUTPATIENT
Start: 2019-06-01 | End: 2019-06-07

## 2019-06-01 RX ORDER — ALBUTEROL 90 UG/1
2 AEROSOL, METERED ORAL
Qty: 0 | Refills: 0 | DISCHARGE
Start: 2019-06-01

## 2019-06-01 RX ORDER — SENNA PLUS 8.6 MG/1
2 TABLET ORAL AT BEDTIME
Refills: 0 | Status: DISCONTINUED | OUTPATIENT
Start: 2019-06-01 | End: 2019-06-01

## 2019-06-01 RX ORDER — DEXTROSE 10 % IN WATER 10 %
1000 INTRAVENOUS SOLUTION INTRAVENOUS
Refills: 0 | Status: DISCONTINUED | OUTPATIENT
Start: 2019-06-01 | End: 2019-06-01

## 2019-06-01 RX ORDER — DOCUSATE SODIUM 100 MG
100 CAPSULE ORAL THREE TIMES A DAY
Refills: 0 | Status: DISCONTINUED | OUTPATIENT
Start: 2019-06-01 | End: 2019-06-07

## 2019-06-01 RX ORDER — DOCUSATE SODIUM 100 MG
1 CAPSULE ORAL
Qty: 0 | Refills: 0 | DISCHARGE
Start: 2019-06-01

## 2019-06-01 RX ORDER — ALBUTEROL 90 UG/1
2 AEROSOL, METERED ORAL EVERY 6 HOURS
Refills: 0 | Status: DISCONTINUED | OUTPATIENT
Start: 2019-06-01 | End: 2019-06-07

## 2019-06-01 RX ADMIN — SENNA PLUS 2 TABLET(S): 8.6 TABLET ORAL at 22:15

## 2019-06-01 RX ADMIN — HEPARIN SODIUM 5000 UNIT(S): 5000 INJECTION INTRAVENOUS; SUBCUTANEOUS at 22:15

## 2019-06-01 RX ADMIN — SODIUM CHLORIDE 75 MILLILITER(S): 9 INJECTION, SOLUTION INTRAVENOUS at 06:47

## 2019-06-01 RX ADMIN — HEPARIN SODIUM 5000 UNIT(S): 5000 INJECTION INTRAVENOUS; SUBCUTANEOUS at 06:47

## 2019-06-01 RX ADMIN — Medication 100 MILLIGRAM(S): at 22:15

## 2019-06-01 NOTE — PROGRESS NOTE ADULT - ATTENDING COMMENTS
55 y/o F with 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 PICC line was placed and parenteral nutrition was started for nutritional support.     increase tpn infusion volume to 2L, tpn is now providing 1350 kcal/day    labs reviewed - increased K and phos in TPN    will follow up on plan with primary team    1. Protein calorie malnutrition being optimized with TPN: CHO [200 ] gm.  AA [80 ] gm. SMOF Lipids [35 ] gm.  2.  Hyperglycemia managed with: [0 ] units of regular insulin    3.  Check fluid balance daily.  Strict I/O  [ ] [ ]   4.  Daily BMP, Ionized Calcium, Magnesium and Phosphorous   5.  Triglycerides at initiation of TPN and monthly [ ] [ ]   6.  Pepcid in TPN for Gi prophylaxis  [ ]
Encourage PO intake.  Continue TPN.  Plan for transfer to [facility].  Plan for ERICA.

## 2019-06-01 NOTE — DISCHARGE NOTE PROVIDER - HOSPITAL COURSE
53 yo F transferred from OSH for further management of 9cm right renal mass with IVC tumor thrombus, weakness, fatigue, epigastric pain, nausea/vomiting, and extensive weight loss. PICC placed 5/31 and TPN started.  CT chest + mets, pending ERICA, cardiology, dental nad medicine consulted.  Transferred to [facility] 6/1/19 for further management in conjunction with CT surgery and vascular surgery.

## 2019-06-01 NOTE — CONSULT NOTE ADULT - PROBLEM SELECTOR RECOMMENDATION 9
urology and cardiology w/u  ERICA monday to futher evaluate  ? OR plan- d/w cardiology/ gu and Dr. Mathis - nephrectomy with removal of IVC thrombus

## 2019-06-01 NOTE — DISCHARGE NOTE PROVIDER - CARE PROVIDER_API CALL
Papo Ware)  Urology  [address] [place] [address], [place] [address]  Phone: [phone]  Fax: [phone]  Follow Up Time:

## 2019-06-01 NOTE — CONSULT NOTE ADULT - ASSESSMENT
This is a 54 year old female with a medical history significant for asthma, and active heavy smoking, presenting as a transfer from [facility] for findings of 9cm right renal mass.  Patient reports she has been having epigastric pain, a poor appetite, and nausea and vomiting for at least 3 months.  She reports she cannot tolerate anything po, vomits up anything she ingests, denies hematemesis.  She reports she has lost nearly 100lbs within this time period as well suggesting that she was over 250lbs prior and is now around 140lbs.  She reports weakness and fatigue as well.  She presented to [facility] for increased vomiting, epigastric pain and resultant weakness on 5/30.  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  Denies fevers/chills.  Denies hematuria, increase in urgency/ frequency, dysuria, denies retention.  Denies chest pain.  Reports occasional shortness of breath most often associated with vomiting.  Denies pain or nausea currently. VSS at this time. PICC placed for nutritional support. Plan for ERICA monday to further evaluate.  Discussed with Dr. Mathis.

## 2019-06-01 NOTE — H&P ADULT - NSHPPHYSICALEXAM_GEN_ALL_CORE
GENERAL: very hard of hearing, nontoxic appearing, unkempt, poor dentition, A+O, no acute distress, eating  LUNGS: clear  HEART: S1S2, RRR  GI/: soft, nontender, no palpable mass, well healed midline incision, no CVAT bilaterally  EXTREMITIES: no pain, no edema, full sensation

## 2019-06-01 NOTE — PROGRESS NOTE ADULT - ASSESSMENT
53 yo F transferred from OSH for further management of 9cm right renal mass with IVC tumor thrombis, weakness, fatigue, epigastric pain, nausea/vomiting, and extensive weight loss. PICC placed 5/31 and TPN started      -AM labs reviewed  -Pain control prn  -Antimetics prn  -Continue TPN  -Cardiology consult appreciated  -Hospitalist consult apprectiated  -Dental conult appreciated  -NPO Sunday night for ERICA  -supportive care  -Dr. Ware to contact CT sx, vascular sx

## 2019-06-01 NOTE — PROGRESS NOTE ADULT - SUBJECTIVE AND OBJECTIVE BOX
Overnight events:  None, pt had PICC placed yesterday, TPN started    Subjective:  Pt states she feels better this am, no pain, able to tolerate more po    Objective:    Vital signs  T(C): , Max: 37.3 (05-31-19 @ 21:17)  HR: 80 (06-01-19 @ 06:43)  BP: 115/73 (06-01-19 @ 06:43)  SpO2: 100% (06-01-19 @ 06:43)  Wt(kg): --    Output   Void: 650  05-31 @ 07:01  -  06-01 @ 07:00  --------------------------------------------------------  IN: 1200 mL / OUT: 900 mL / NET: 300 mL        Gen: NAD  Abd: soft, minimal RUQ tenderness, no rebound guarding or distention  LUE PICC dressing c/d/i    Labs                        10.7   8.20  )-----------( 392      ( 01 Jun 2019 06:17 )             32.5     01 Jun 2019 06:17    139    |  104    |  10     ----------------------------<  102    3.5     |  25     |  0.83     Ca    12.0       01 Jun 2019 06:17  Phos  0.9       01 Jun 2019 06:17  Mg     1.8       01 Jun 2019 06:17

## 2019-06-01 NOTE — CONSULT NOTE ADULT - SUBJECTIVE AND OBJECTIVE BOX
History of Present Illness:  54y Female    Past Medical History  [place] (hard of hearing): uses hearing aides bilaterally  Asthma      Past Surgical History  History of tubal ligation      MEDICATIONS  (STANDING):  docusate sodium 100 milliGRAM(s) Oral three times a day  heparin  Injectable 5000 Unit(s) SubCutaneous every 8 hours  nicotine - 21 mG/24Hr(s) Patch 1 patch Transdermal daily  senna 2 Tablet(s) Oral at bedtime    MEDICATIONS  (PRN):  acetaminophen   Tablet .. 650 milliGRAM(s) Oral every 6 hours PRN Temp greater or equal to 38C (100.4F), Mild Pain (1 - 3)  ALBUTerol    90 MICROgram(s) HFA Inhaler 2 Puff(s) Inhalation every 6 hours PRN Shortness of Breath and/or Wheezing    Antiplatelet therapy:                           Last dose/amt:    Allergies: No Known Allergies      SOCIAL HISTORY:  Smoker: [x ] Yes  [ ] No        PACK YEARS:                         WHEN QUIT?  ETOH use: [ ] Yes  [ ] No              FREQUENCY / QUANTITY:  Ilicit Drug use:  [ ] Yes  [ ] No  Occupation:  Live with:  Assist device use:    Relevant Family History  FAMILY HISTORY:  FH: ovarian cancer: Aunt      Review of Systems  GENERAL:  Fevers[] chills[] sweats[] fatigue[] weight loss[] weight gain []                                        NEURO:  parathesias[] seizures []  syncope []  confusion []                                                                                  EYES: glasses[]  blurry vision[]  discharge[] pain[] glaucoma []                                                                            ENMT:  difficulty hearing []  vertigo[]  dysphagia[] epistaxis[] recent dental work []                                      CV:  chest pain[] palpitations[] PAN [] diaphoresis [] edema[]                                                                                             RESPIRATORY:  wheezing[] SOB[] cough [] sputum[] hemoptysis[]                                                                    GI:  nausea[]  vomiting []  diarrhea[] constipation [] melena []                                                                        : hematuria[ ]  dysuria[ ] urgency[] incontinence[]                                                                                              MUSKULOSKELETAL:  arthritis[ ]  joint swelling [ ] muscle weakness [ ]                                                                  SKIN/BREAST:  rash[ ] itching [ ]  hair loss[ ] masses[ ]                                                                                                PSYCH:  dementia [ ] depresion [ ] anxiety[ ]                                                                                                                  HEME/LYMPH:  bruises easily[ ] enlarged lymph nodes[ ] tender lymph nodes[ ]                                                 ENDOCRINE:  cold intolerance[ ] heat intolerance[ ] polydipsia[ ]                                                                              PHYSICAL EXAM  Vital Signs Last 24 Hrs  T(C): 36.8 (2019 12:55), Max: 37.3 (31 May 2019 21:17)  T(F): 98.2 (2019 12:55), Max: 99.1 (31 May 2019 21:17)  HR: 86 (2019 12:55) (78 - 91)  BP: 119/74 (2019 12:55) (115/73 - 121/77)  BP(mean): --  RR: 18 (2019 12:55) (16 - 18)  SpO2: 98% (2019 12:55) (98% - 100%)    General: Well nourished, well developed, no acute distress.                                                         Neuro: Normal exam oriented to person/place & time with no focal motor or sensory  deficits.                    Eyes: Normal exam of conjunctiva & lids, pupils equally reactive.   ENT: Normal exam of nasal/oral mucosa with absence of cyanosis.   Neck: Normal exam of jugular veins, trachea & thyroid.   Chest: Normal lung exam with good air movement absence of wheezes, rales, or rhonchi:                                                                          CV:  Auscultation: normal [ ] S3[ ] S4[ ] Irregular [ ] Rub[ ] Clicks[ ]  Murmurs none:[ ]systolic [ ]  diastolic [ ] holosystolic [ ]  Carotids: No Bruits[ ] Other____________ Abdominal Aorta: normal [ ] nonpalpable[ ]                                                                         GI: Normal exam of abdomen, liver & spleen with no noted masses or tenderness.                                                                                              Extremities: Normal no evidence of cyanosis or deformity Edema: none[ ]trace[ ]1+[ ]2+[ ]3+[ ]4+[ ]  Lower Extremity Pulses: Right[ ] Left[ ]Varicosities[ ]  SKIN : Normal exam to inspection & palation.                                                           LABS:                        10.7   8.20  )-----------( 392      ( 2019 06:17 )             32.5     06-    139  |  104  |  10  ----------------------------<  102<H>  3.5   |  25  |  0.83    Ca    12.0<H>      2019 06:17  Phos  0.9     06-  Mg     1.8     06-    TPro  6.6  /  Alb  3.7  /  TBili  0.3  /  DBili  x   /  AST  5   /  ALT  < 4<L>  /  AlkPhos  81  05-31    PT/INR - ( 31 May 2019 04:00 )   PT: 13.1 SEC;   INR: 1.14          PTT - ( 31 May 2019 04:00 )  PTT:32.6 SEC  Urinalysis Basic - ( 31 May 2019 05:30 )    Color: YELLOW / Appearance: CLEAR / S.035 / pH: 6.5  Gluc: NEGATIVE / Ketone: NEGATIVE  / Bili: NEGATIVE / Urobili: TRACE   Blood: NEGATIVE / Protein: 30 / Nitrite: NEGATIVE   Leuk Esterase: NEGATIVE / RBC: 0-2 / WBC 0-2   Sq Epi: FEW / Non Sq Epi: x / Bacteria: NEGATIVE History of Present Illness:  54y Female    Past Medical History  [place] (hard of hearing): uses hearing aides bilaterally  Asthma      Past Surgical History  History of tubal ligation      MEDICATIONS  (STANDING):  docusate sodium 100 milliGRAM(s) Oral three times a day  heparin  Injectable 5000 Unit(s) SubCutaneous every 8 hours  nicotine - 21 mG/24Hr(s) Patch 1 patch Transdermal daily  senna 2 Tablet(s) Oral at bedtime    MEDICATIONS  (PRN):  acetaminophen   Tablet .. 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 pack and a half a day since the age of 18  Drinks [employer] every Friday and Saturday, reports 1 each day  Smokes marijuana occasionally      Review of Systems  GENERAL:  Fevers[] chills[] sweats[] fatigue[] weight loss[] weight gain []                                        NEURO:  parathesias[] seizures []  syncope []  confusion []                                                                                  EYES: glasses[]  blurry vision[]  discharge[] pain[] glaucoma []                                                                            ENMT:  difficulty hearing []  vertigo[]  dysphagia[] epistaxis[] recent dental work []                                      CV:  chest pain[] palpitations[] PAN [x] diaphoresis [] edema [x]                                                                                       RESPIRATORY:  wheezing[] SOB[] cough [] sputum[] hemoptysis[]                                                                    GI:  nausea[x]  vomiting []  diarrhea[] constipation [] melena []                                                                        : hematuria[ ]  dysuria[ ] urgency[] incontinence[]                                                                                              MUSKULOSKELETAL:  arthritis[ ]  joint swelling [ ] muscle weakness [ ]                                                                  SKIN/BREAST:  rash[ ] itching [ ]  hair loss[ ] masses[ ]                                                                                                PSYCH:  dementia [ ] depresion [ ] anxiety[ ]                                                                                                                  HEME/LYMPH:  bruises easily[ ] enlarged lymph nodes[ ] tender lymph nodes[ ]                                                 ENDOCRINE:  cold intolerance[ ] heat intolerance[ ] polydipsia[ ]                                                                              PHYSICAL EXAM  Vital Signs Last 24 Hrs  T(C): 36.8 (2019 12:55), Max: 37.3 (31 May 2019 21:17)  T(F): 98.2 (2019 12:55), Max: 99.1 (31 May 2019 21:17)  HR: 86 (2019 12:55) (78 - 91)  BP: 119/74 (2019 12:55) (115/73 - 121/77)  BP(mean): --  RR: 18 (2019 12:55) (16 - 18)  SpO2: 98% (2019 12:55) (98% - 100%)    General: Well nourished, well developed, no acute distress.                                                         Neuro: Normal exam oriented to person/place & time with no focal motor or sensory  deficits.                    Eyes: Normal exam of conjunctiva & lids, pupils equally reactive.   ENT: Normal exam of nasal/oral mucosa with absence of cyanosis.   Neck: Normal exam of jugular veins, trachea & thyroid.   Chest: Normal lung exam with good air movement absence of wheezes, rales, or rhonchi:                                                                          CV:  Auscultation: normal [ ] S3[ ] S4[ ] Irregular [ ] Rub[ ] Clicks[ ]  Murmurs none:[ ]systolic [ ]  diastolic [ ] holosystolic [ ]  Carotids: No Bruits[ ] Other____________ Abdominal Aorta: normal [ ] nonpalpable[ ]                                                                         GI: Normal exam of abdomen, liver & spleen with no noted masses or tenderness.                                                                                              Extremities: Normal no evidence of cyanosis or deformity Edema: none[ ]trace[x ]1+[ ]2+[ ]3+[ ]4+[ ]  Lower Extremity Pulses: Right[ ] Left[ ]Varicosities[ ]  SKIN : Normal exam to inspection & palation.                                                           LABS:                        10.7   8.20  )-----------( 392      ( 2019 06:17 )             32.5     06-    139  |  104  |  10  ----------------------------<  102<H>  3.5   |  25  |  0.83    Ca    12.0<H>      2019 06:17  Phos  0.9     06-  Mg     1.8     06-    TPro  6.6  /  Alb  3.7  /  TBili  0.3  /  DBili  x   /  AST  5   /  ALT  < 4<L>  /  AlkPhos  81  05-31    PT/INR - ( 31 May 2019 04:00 )   PT: 13.1 SEC;   INR: 1.14          PTT - ( 31 May 2019 04:00 )  PTT:32.6 SEC  Urinalysis Basic - ( 31 May 2019 05:30 )    Color: YELLOW / Appearance: CLEAR / S.035 / pH: 6.5  Gluc: NEGATIVE / Ketone: NEGATIVE  / Bili: NEGATIVE / Urobili: TRACE   Blood: NEGATIVE / Protein: 30 / Nitrite: NEGATIVE   Leuk Esterase: NEGATIVE / RBC: 0-2 / WBC 0-2   Sq Epi: FEW / Non Sq Epi: x / Bacteria: NEGATIVE History of Present Illness:  54y Female    Past Medical History  [place] (hard of hearing): uses hearing aides bilaterally  Asthma      Past Surgical History  History of tubal ligation      MEDICATIONS  (STANDING):  docusate sodium 100 milliGRAM(s) Oral three times a day  heparin  Injectable 5000 Unit(s) SubCutaneous every 8 hours  nicotine - 21 mG/24Hr(s) Patch 1 patch Transdermal daily  senna 2 Tablet(s) Oral at bedtime    MEDICATIONS  (PRN):  acetaminophen   Tablet .. 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 pack and a half a day since the age of 18  Drinks [employer] every Friday and Saturday, reports 1 each day  Smokes marijuana occasionally      Review of Systems  GENERAL:  Fevers[] chills[] sweats[] fatigue[] weight loss[] weight gain []                                        NEURO:  parathesias[] seizures []  syncope []  confusion []                                                                                  EYES: glasses[]  blurry vision[]  discharge[] pain[] glaucoma []                                                                            ENMT:  difficulty hearing []  vertigo[]  dysphagia[] epistaxis[] recent dental work []                                      CV:  chest pain[] palpitations[] PAN [x] diaphoresis [] edema [x]                                                                                       RESPIRATORY:  wheezing[] SOB[] cough [] sputum[] hemoptysis[]                                                                    GI:  nausea[x]  vomiting []  diarrhea[] constipation [] melena []                                                                        : hematuria[ ]  dysuria[ ] urgency[] incontinence[]                                                                                              MUSKULOSKELETAL:  arthritis[ ]  joint swelling [ ] muscle weakness [ ]                                                                  SKIN/BREAST:  rash[ ] itching [ ]  hair loss[ ] masses[ ]                                                                                                PSYCH:  dementia [ ] depresion [ ] anxiety[ ]                                                                                                                  HEME/LYMPH:  bruises easily[ ] enlarged lymph nodes[ ] tender lymph nodes[ ]                                                 ENDOCRINE:  cold intolerance[ ] heat intolerance[ ] polydipsia[ ]                                                                              PHYSICAL EXAM  Vital Signs Last 24 Hrs  T(C): 36.8 (2019 12:55), Max: 37.3 (31 May 2019 21:17)  T(F): 98.2 (2019 12:55), Max: 99.1 (31 May 2019 21:17)  HR: 86 (2019 12:55) (78 - 91)  BP: 119/74 (2019 12:55) (115/73 - 121/77)  BP(mean): --  RR: 18 (2019 12:55) (16 - 18)  SpO2: 98% (2019 12:55) (98% - 100%)    General: Well nourished, well developed, no acute distress.                                                         Neuro: Normal exam oriented to person/place & time with no focal motor or sensory  deficits.                    Eyes: Normal exam of conjunctiva & lids, pupils equally reactive.   ENT: Normal exam of nasal/oral mucosa with absence of cyanosis.   Neck: Normal exam of jugular veins, trachea & thyroid.   Chest: Normal lung exam with good air movement absence of wheezes, rales, or rhonchi:                                                                          CV:  Auscultation: normal [ x] S3[ ] S4[ ] Irregular [ ] Rub[ ] Clicks[ ]  Murmurs none:[ ]systolic [ ]  diastolic [ ] holosystolic [ ]  Carotids: No Bruits[ ] Other____________ Abdominal Aorta: normal [ ] nonpalpable[ ]                                                                         GI: Normal exam of abdomen, liver & spleen with no noted masses or tenderness.                                                                                              Extremities: Normal no evidence of cyanosis or deformity Edema: none[ ]trace[x ]1+[ ]2+[ ]3+[ ]4+[ ]  Lower Extremity Pulses: Right[ ] Left[ ]Varicosities[ ] trace LE edema bilaterally   SKIN : Normal exam to inspection & palation. + left arm picc                                                           LABS:                        10.7   8.20  )-----------( 392      ( 2019 06:17 )             32.5     06-01    139  |  104  |  10  ----------------------------<  102<H>  3.5   |  25  |  0.83    Ca    12.0<H>      2019 06:17  Phos  0.9     06-  Mg     1.8     06-    TPro  6.6  /  Alb  3.7  /  TBili  0.3  /  DBili  x   /  AST  5   /  ALT  < 4<L>  /  AlkPhos  81  05-31    PT/INR - ( 31 May 2019 04:00 )   PT: 13.1 SEC;   INR: 1.14          PTT - ( 31 May 2019 04:00 )  PTT:32.6 SEC  Urinalysis Basic - ( 31 May 2019 05:30 )    Color: YELLOW / Appearance: CLEAR / S.035 / pH: 6.5  Gluc: NEGATIVE / Ketone: NEGATIVE  / Bili: NEGATIVE / Urobili: TRACE   Blood: NEGATIVE / Protein: 30 / Nitrite: NEGATIVE   Leuk Esterase: NEGATIVE / RBC: 0-2 / WBC 0-2   Sq Epi: FEW / Non Sq Epi: x / Bacteria: NEGATIVE

## 2019-06-01 NOTE — PATIENT PROFILE ADULT - STATED REASON FOR ADMISSION
Patient states she went to [facility] for N/V. Vomitted at [facility] and was now transferred to [facility] ED for more treatment.

## 2019-06-01 NOTE — H&P ADULT - ASSESSMENT
54 year old female with a medical history significant for asthma, and active heavy smoking, presenting as a transfer from [facility] for findings of 9cm right renal mass with weakness, fatigue, epigastric pain, nausea/vomiting, and extensive weight loss.  Transferred to [facility] to urology for workup and management of mass and CT findings.    -Admit to Urology for workup and management of CT findings  -Pain control prn  -Antimetics prn  -calorie count  -plan for ERICA monday, appreciate cardiology recs  -dental on board  -OR time pending clearance/workup and coordination with co-surgeon teams  -PICC and plan for TPN, call GI in am  -Discussed with Dr. Ware 54 year old female with a medical history significant for asthma, and active heavy smoking, presenting as a transfer from [facility] for findings of 9cm right renal mass with weakness, fatigue, epigastric pain, nausea/vomiting, and extensive weight loss.  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    -Pain control prn  -Antimetics prn  -calorie count  -plan for [facility]ICA monday, appreciate cardiology recs  -dental on board  -OR time pending clearance/workup and coordination with co-surgeon teams  -PICC and plan for TPN, call GI in am. nutritional support	  -Discussed with Dr. Ware

## 2019-06-01 NOTE — PROGRESS NOTE ADULT - SUBJECTIVE AND OBJECTIVE BOX
NUTRITION NOTE  [id number] CARMELA  ===============================    Interval events  no acute events overnight, picc placed, TPN/lipids started  increased to full volume and calories today    Vital Signs Last 24 Hrs  T(C): 36.9 (01 Jun 2019 06:43), Max: 37.3 (31 May 2019 21:17)  T(F): 98.4 (01 Jun 2019 06:43), Max: 99.1 (31 May 2019 21:17)  HR: 80 (01 Jun 2019 06:43) (57 - 91)  BP: 115/73 (01 Jun 2019 06:43) (115/73 - 164/85)  RR: 16 (01 Jun 2019 06:43) (16 - 17)  SpO2: 100% (01 Jun 2019 06:43) (100% - 100%)    MEDICATIONS  (STANDING):  fat emulsion (Fish Oil and Plant Based) 20% Infusion 14.5 mL/Hr (14.5 mL/Hr) IV Continuous <Continuous>  fat emulsion (Fish Oil and Plant Based) 20% Infusion 6.6 mL/Hr (6.6 mL/Hr) IV Continuous <Continuous>  heparin  Injectable 5000 Unit(s) SubCutaneous every 8 hours  lactated ringers. 1000 milliLiter(s) (75 mL/Hr) IV Continuous <Continuous>  nicotine - 21 mG/24Hr(s) Patch 1 patch Transdermal daily  Parenteral Nutrition - Adult 1 Each (42 mL/Hr) TPN Continuous <Continuous>  Parenteral Nutrition - Adult 1 Each (83 mL/Hr) TPN Continuous <Continuous>    MEDICATIONS  (PRN):  acetaminophen   Tablet .. 650 milliGRAM(s) Oral every 6 hours PRN Temp greater or equal to 38C (100.4F), Mild Pain (1 - 3)  ALBUTerol    90 MICROgram(s) HFA Inhaler 2 Puff(s) Inhalation every 6 hours PRN Shortness of Breath and/or Wheezing  ondansetron Injectable 4 milliGRAM(s) IV Push every 6 hours PRN Nausea and/or Vomiting    I&O's Detail    31 May 2019 07:01  -  01 Jun 2019 07:00  --------------------------------------------------------  IN:    IV PiggyBack: 300 mL    lactated ringers.: 900 mL  Total IN: 1200 mL    OUT:    Voided: 900 mL  Total OUT: 900 mL    Total NET: 300 mL    POCT Blood Glucose.: 112 mg/dL (01 Jun 2019 06:17)  POCT Blood Glucose.: 121 mg/dL (31 May 2019 23:14)    PHYSICAL EXAM   General: NAD, resting comfortably in bed  Pulm: nonlabored respirations  Abdomen: soft, nontender  Extremities: warm, no edema     LABS                      10.7   8.20  )-----------( 392      ( 01 Jun 2019 06:17 )             32.5       139  |  104  |  10  ----------------------------<  102<H>  3.5   |  25  |  0.83    Ca    12.0<H>      01 Jun 2019 06:17  Phos  0.9     06-01  Mg     1.8     06-01    TPro  6.6  /  Alb  3.7  /  TBili  0.3  /  DBili  x   /  AST  5   /  ALT  < 4<L>  /  AlkPhos  81  05-31    LIVER FUNCTIONS - ( 31 May 2019 04:00 )  Alb: 3.7 g/dL / Pro: 6.6 g/dL / ALK PHOS: 81 u/L / ALT: < 4 u/L / AST: 5 u/L / GGT: x           06-01 Chol -- LDL -- HDL -- Trig 103    Diet: regular and TPN/lipids     ASSESSMENT/PLAN:  53 y/o F with 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 PICC line was placed and parenteral nutrition was started for nutritional support.     increase tpn infusion volume to 2L, tpn is now providing 1350 kcal/day    labs reviewed - increased K and phos in TPN    will follow up on plan with primary team    1. Protein calorie malnutrition being optimized with TPN: CHO [200 ] gm.  AA [80 ] gm. SMOF Lipids [35 ] gm.  2.  Hyperglycemia managed with: [0 ] units of regular insulin    3.  Check fluid balance daily.  Strict I/O  [ ] [ ]   4.  Daily BMP, Ionized Calcium, Magnesium and Phosphorous   5.  Triglycerides at initiation of TPN and monthly [ ] [ ]   6.  Pepcid in TPN for Gi prophylaxis  [ ]

## 2019-06-01 NOTE — DISCHARGE NOTE PROVIDER - NSDCCPCAREPLAN_GEN_ALL_CORE_FT
PRINCIPAL DISCHARGE DIAGNOSIS  Diagnosis: Renal mass  Assessment and Plan of Treatment: Transferred to [facility] for further management      SECONDARY DISCHARGE DIAGNOSES  Diagnosis: Inferior vena caval thrombosis  Assessment and Plan of Treatment: Inferior vena caval thrombosis

## 2019-06-01 NOTE — PATIENT PROFILE ADULT - VISION (WITH CORRECTIVE LENSES IF THE PATIENT USUALLY WEARS THEM):
Partially impaired: cannot see medication labels or newsprint, but can see obstacles in path, and the surrounding layout; can count fingers at arm's length/only to read

## 2019-06-01 NOTE — H&P ADULT - NSHPSOCIALHISTORY_GEN_ALL_CORE
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

## 2019-06-01 NOTE — CONSULT NOTE ADULT - SUBJECTIVE AND OBJECTIVE BOX
Patient is a 54y old  Female who presents with a chief complaint of     HPI: Pt was transferred from [facility] earlier today.    Dental was consulted for evaluation of loose teeth prior to ERICA on Monday.  Pt reports no dental pain at this time.    PAST MEDICAL & SURGICAL HISTORY:  [place] (hard of hearing): uses hearing aides bilaterally  Asthma  History of tubal ligation      MEDICATIONS  (STANDING):  docusate sodium 100 milliGRAM(s) Oral three times a day  heparin  Injectable 5000 Unit(s) SubCutaneous every 8 hours  nicotine - 21 mG/24Hr(s) Patch 1 patch Transdermal daily  senna 2 Tablet(s) Oral at bedtime    MEDICATIONS  (PRN):  acetaminophen   Tablet .. 650 milliGRAM(s) Oral every 6 hours PRN Temp greater or equal to 38C (100.4F), Mild Pain (1 - 3)  ALBUTerol    90 MICROgram(s) HFA Inhaler 2 Puff(s) Inhalation every 6 hours PRN Shortness of Breath and/or Wheezing      Allergies  No Known Allergies    Intolerances        FAMILY HISTORY:  FH: ovarian cancer: Aunt      SOCIAL HISTORY: Pt presents in hospital room with family bedside. Pt has hx of active heavy smoking.    Last Dental Visit: unknown    Vital Signs Last 24 Hrs  T(C): 36.8 (2019 12:55), Max: 37.3 (31 May 2019 21:17)  T(F): 98.2 (2019 12:55), Max: 99.1 (31 May 2019 21:17)  HR: 86 (2019 12:55) (78 - 91)  BP: 119/74 (2019 12:55) (115/73 - 121/77)  BP(mean): --  RR: 18 (2019 12:55) (16 - 18)  SpO2: 98% (2019 12:55) (98% - 100%)    EOE:  TMJ ( - ) clicks                    (  - ) pops                    (  - ) crepitus             Mandible FROM             Facial bones and MOM grossly intact             ( - ) trismus             ( - ) LAD             ( - ) swelling             ( - ) asymmetry             ( - ) palpation    IOE:  permanent dentition: multiple carious teeth and root tips           hard/soft palate:   WNL            tongue/FOM WNL           labial/buccal mucosa WNL           ( - ) percussion           ( - ) palpation           ( - ) swelling     Dentition present: <<   >>  Mobility: Teeth #23, #24, #25 present with class II mobility. Tooth #5 and #29 present with class I mobility.   Caries: <<   >>     Radiographs: none indicated at this time.    LABS:                        10.7   8.20  )-----------( 392      ( 2019 06:17 )             32.5         139  |  104  |  10  ----------------------------<  102<H>  3.5   |  25  |  0.83    Ca    12.0<H>      2019 06:17  Phos  0.9       Mg     1.8         TPro  6.6  /  Alb  3.7  /  TBili  0.3  /  DBili  x   /  AST  5   /  ALT  < 4<L>  /  AlkPhos  81      WBC Count: 8.20 K/uL [3.8 - 10.5] ( @ 06:17)    Platelet Count - Automated: 392 K/uL [150 - 400] ( @ 06:17)  Platelet Count - Automated: 424 K/uL <H> [150 - 400] ( @ 04:00)    INR: 1.14 [0.88 - 1.17] ( @ 04:00)      Urinalysis Basic - ( 31 May 2019 05:30 )    Color: YELLOW / Appearance: CLEAR / S.035 / pH: 6.5  Gluc: NEGATIVE / Ketone: NEGATIVE  / Bili: NEGATIVE / Urobili: TRACE   Blood: NEGATIVE / Protein: 30 / Nitrite: NEGATIVE   Leuk Esterase: NEGATIVE / RBC: 0-2 / WBC 0-2   Sq Epi: FEW / Non Sq Epi: x / Bacteria: NEGATIVE      ASSESSMENT: Teeth #23, #24, #25 present with class II mobility and teeth #5 and #29 present with class I mobility. Not an aspiration risk at this time.    PROCEDURE:  Verbal  consent given.  Bedside EOE, IOE.    RECOMMENDATIONS:   1) Care should be taken to avoid excessive forces on teeth. No additional treatment indicated at this time.  2) Dental F/U with outpatient dentist for comprehensive dental care.   3) If any difficulty swallowing/breathing, fever occur, page dental.     Emma Hodge DDS, pager: [phone]  Oral surgeon consulted: Dr. Arvizu Patient is a 54y old  Female who presents with a chief complaint of renal mass (31 May 2019 11:22)    HPI:  This is a 54 year old female with a medical history significant for asthma, and active heavy smoking, presenting as a transfer from [facility] for findings of 9cm right renal mass.  Patient reports she has been having epigastric pain, a poor appetite, and nausea and vomiting for at least 3 months.  She reports she cannot tolerate anything po, vomits up anything she ingests, denies hematemesis.  She reports she has lost nearly 100lbs within this time period as well suggesting that she was over 250lbs prior and is now around 140lbs.  She reports weakness and fatigue as well.  She presented to [facility] for increased vomiting, epigastric pain and resultant weakness on .  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  Denies fevers/chills.  Denies hematuria, increase in urgency/ frequency, dysuria, denies retention.  Denies chest pain.  Reports occasional shortness of breath most often associated with vomiting.  Denies pain or nausea currently. (31 May 2019 04:35)    Pt presents as transfer from [facility] for further treatment.  Dental team consulted for loose teeth and dental evaluation prior to ERICA planned for Monday.   Patient denies any pain, discomfort, trouble breathing or swelling.     PAST MEDICAL & SURGICAL HISTORY:  [place] (hard of hearing): uses hearing aides bilaterally  Asthma  History of tubal ligation      MEDICATIONS  (STANDING):  docusate sodium 100 milliGRAM(s) Oral three times a day  heparin  Injectable 5000 Unit(s) SubCutaneous every 8 hours  nicotine - 21 mG/24Hr(s) Patch 1 patch Transdermal daily  senna 2 Tablet(s) Oral at bedtime    MEDICATIONS  (PRN):  acetaminophen   Tablet .. 650 milliGRAM(s) Oral every 6 hours PRN Temp greater or equal to 38C (100.4F), Mild Pain (1 - 3)  ALBUTerol    90 MICROgram(s) HFA Inhaler 2 Puff(s) Inhalation every 6 hours PRN Shortness of Breath and/or Wheezing      Allergies  No Known Allergies    Intolerances        FAMILY HISTORY:  FH: ovarian cancer: Aunt      SOCIAL HISTORY: Pt presents in hospital room with family bedside. Pt has hx of active heavy smoking.    Last Dental Visit: unknown    Vital Signs Last 24 Hrs  T(C): 36.8 (2019 12:55), Max: 37.3 (31 May 2019 21:17)  T(F): 98.2 (2019 12:55), Max: 99.1 (31 May 2019 21:17)  HR: 86 (2019 12:55) (78 - 91)  BP: 119/74 (2019 12:55) (115/73 - 121/77)  BP(mean): --  RR: 18 (2019 12:55) (16 - 18)  SpO2: 98% (2019 12:55) (98% - 100%)    EOE:  TMJ ( - ) clicks                    (  - ) pops                    (  - ) crepitus             Mandible FROM             Facial bones and MOM grossly intact             ( - ) trismus             ( - ) LAD             ( - ) swelling             ( - ) asymmetry             ( - ) palpation    IOE:  permanent dentition: multiple carious teeth and root tips           hard/soft palate:   WNL            tongue/FOM WNL           labial/buccal mucosa WNL           ( - ) percussion           ( - ) palpation           ( - ) swelling     Dentition present: <<   >>  Mobility: Teeth #23, #24, #25 present with class II mobility. Tooth #5 and #29 present with class I mobility.   Caries: <<   >>     Radiographs: none indicated at this time.    LABS:                        10.7   8.20  )-----------( 392      ( 2019 06:17 )             32.5         139  |  104  |  10  ----------------------------<  102<H>  3.5   |  25  |  0.83    Ca    12.0<H>      2019 06:17  Phos  0.9       Mg     1.8         TPro  6.6  /  Alb  3.7  /  TBili  0.3  /  DBili  x   /  AST  5   /  ALT  < 4<L>  /  AlkPhos  81  05-    WBC Count: 8.20 K/uL [3.8 - 10.5] ( @ 06:17)    Platelet Count - Automated: 392 K/uL [150 - 400] ( @ 06:17)  Platelet Count - Automated: 424 K/uL <H> [150 - 400] ( @ 04:00)    INR: 1.14 [0.88 - 1.17] ( @ 04:00)      Urinalysis Basic - ( 31 May 2019 05:30 )    Color: YELLOW / Appearance: CLEAR / S.035 / pH: 6.5  Gluc: NEGATIVE / Ketone: NEGATIVE  / Bili: NEGATIVE / Urobili: TRACE   Blood: NEGATIVE / Protein: 30 / Nitrite: NEGATIVE   Leuk Esterase: NEGATIVE / RBC: 0-2 / WBC 0-2   Sq Epi: FEW / Non Sq Epi: x / Bacteria: NEGATIVE      ASSESSMENT: Teeth #23, #24, #25 present with class II mobility and teeth #5 and #29 present with class I mobility. Not an aspiration risk at this time.    PROCEDURE:  Verbal  consent given.  Bedside EOE, IOE.    RECOMMENDATIONS:   1) Care should be taken to avoid excessive forces on teeth. No additional treatment indicated at this time.  2) Dental F/U with outpatient dentist for comprehensive dental care.   3) If any difficulty swallowing/breathing, fever occur, page dental.     Emma Hodge DDS, pager: [phone]  Oral surgeon consulted: Dr. Arvizu Patient is a 54y old  Female who presents with a chief complaint of renal mass (31 May 2019 11:22)    HPI:  This is a 54 year old female with a medical history significant for asthma, and active heavy smoking, presenting as a transfer from [facility] for findings of 9cm right renal mass.  Patient reports she has been having epigastric pain, a poor appetite, and nausea and vomiting for at least 3 months.  She reports she cannot tolerate anything po, vomits up anything she ingests, denies hematemesis.  She reports she has lost nearly 100lbs within this time period as well suggesting that she was over 250lbs prior and is now around 140lbs.  She reports weakness and fatigue as well.  She presented to [facility] for increased vomiting, epigastric pain and resultant weakness on .  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  Denies fevers/chills.  Denies hematuria, increase in urgency/ frequency, dysuria, denies retention.  Denies chest pain.  Reports occasional shortness of breath most often associated with vomiting.  Denies pain or nausea currently. (31 May 2019 04:35)    Pt presents as transfer from [facility] for further treatment.  Dental team consulted for loose teeth and dental evaluation prior to ERICA planned for Monday.   Patient denies any pain, discomfort, trouble breathing or swelling.     PAST MEDICAL & SURGICAL HISTORY:  [place] (hard of hearing): uses hearing aides bilaterally  Asthma  History of tubal ligation      MEDICATIONS  (STANDING):  docusate sodium 100 milliGRAM(s) Oral three times a day  heparin  Injectable 5000 Unit(s) SubCutaneous every 8 hours  nicotine - 21 mG/24Hr(s) Patch 1 patch Transdermal daily  senna 2 Tablet(s) Oral at bedtime    MEDICATIONS  (PRN):  acetaminophen   Tablet .. 650 milliGRAM(s) Oral every 6 hours PRN Temp greater or equal to 38C (100.4F), Mild Pain (1 - 3)  ALBUTerol    90 MICROgram(s) HFA Inhaler 2 Puff(s) Inhalation every 6 hours PRN Shortness of Breath and/or Wheezing      Allergies  No Known Allergies    Intolerances        FAMILY HISTORY:  FH: ovarian cancer: Aunt      SOCIAL HISTORY: Pt presents in hospital room with family bedside. Pt has hx of active heavy smoking.    Last Dental Visit: unknown    Vital Signs Last 24 Hrs  T(C): 36.8 (2019 12:55), Max: 37.3 (31 May 2019 21:17)  T(F): 98.2 (2019 12:55), Max: 99.1 (31 May 2019 21:17)  HR: 86 (2019 12:55) (78 - 91)  BP: 119/74 (2019 12:55) (115/73 - 121/77)  BP(mean): --  RR: 18 (2019 12:55) (16 - 18)  SpO2: 98% (2019 12:55) (98% - 100%)    EOE:  TMJ ( - ) clicks                    (  - ) pops                    (  - ) crepitus             Mandible FROM             Facial bones and MOM grossly intact             ( - ) trismus             ( - ) LAD             ( - ) swelling             ( - ) asymmetry             ( - ) palpation    IOE:  permanent dentition: multiple carious teeth and root tips           hard/soft palate:   WNL            tongue/FOM WNL           labial/buccal mucosa WNL           ( - ) percussion           ( - ) palpation           ( - ) swelling     Severe calculus accumulation.   Generalized retained root tips.   Clinical crowns present teeth 5,23,24, 25 and 28. Gross caries present.   Teeth #23, #24, #25 present with class II mobility. Tooth #5 and #29 present with class I mobility.       Radiographs: none indicated at this time.    LABS:                        10.7   8.20  )-----------( 392      ( 2019 06:17 )             32.5         139  |  104  |  10  ----------------------------<  102<H>  3.5   |  25  |  0.83    Ca    12.0<H>      2019 06:17  Phos  0.9     -  Mg     1.8         TPro  6.6  /  Alb  3.7  /  TBili  0.3  /  DBili  x   /  AST  5   /  ALT  < 4<L>  /  AlkPhos  81  05-    WBC Count: 8.20 K/uL [3.8 - 10.5] ( @ 06:17)    Platelet Count - Automated: 392 K/uL [150 - 400] ( @ 06:17)  Platelet Count - Automated: 424 K/uL <H> [150 - 400] ( @ 04:00)    INR: 1.14 [0.88 - 1.17] ( @ 04:00)      Urinalysis Basic - ( 31 May 2019 05:30 )    Color: YELLOW / Appearance: CLEAR / S.035 / pH: 6.5  Gluc: NEGATIVE / Ketone: NEGATIVE  / Bili: NEGATIVE / Urobili: TRACE   Blood: NEGATIVE / Protein: 30 / Nitrite: NEGATIVE   Leuk Esterase: NEGATIVE / RBC: 0-2 / WBC 0-2   Sq Epi: FEW / Non Sq Epi: x / Bacteria: NEGATIVE      ASSESSMENT: Teeth #23, #24, #25 present with class II mobility and teeth #5 and #29 present with class I mobility. Not an aspiration risk at this time. No additional treatment indicated at this time.    PROCEDURE:  Verbal  consent given.  Bedside EOE, IOE.    RECOMMENDATIONS:   1) Care should be taken to avoid excessive forces on teeth. No additional treatment indicated at this time.  2) Dental F/U with outpatient dentist for comprehensive dental care.   3) If any difficulty swallowing/breathing, fever occur, page dental.     Emma Hodge DDS, pager: [phone]  Oral surgeon consulted: Dr. Arvizu

## 2019-06-01 NOTE — H&P ADULT - HISTORY OF PRESENT ILLNESS
This is a 54 year old female with a medical history significant for asthma, and active heavy smoking, presenting as a transfer from [facility] for findings of 9cm right renal mass.  Patient reports she has been having epigastric pain, a poor appetite, and nausea and vomiting for at least 3 months.  She reports she cannot tolerate anything po, vomits up anything she ingests, denies hematemesis.  She reports she has lost nearly 100lbs within this time period as well suggesting that she was over 250lbs prior and is now around 140lbs.  She reports weakness and fatigue as well.  She presented to [facility] for increased vomiting, epigastric pain and resultant weakness on 5/30.  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  Denies fevers/chills.  Denies hematuria, increase in urgency/ frequency, dysuria, denies retention.  Denies chest pain.  Reports occasional shortness of breath most often associated with vomiting.  Denies pain or nausea currently.

## 2019-06-01 NOTE — H&P ADULT - NSICDXPASTMEDICALHX_GEN_ALL_CORE_FT
PAST MEDICAL HISTORY:  Asthma     [place] (hard of hearing) uses hearing aides bilaterally

## 2019-06-02 LAB
ANION GAP SERPL CALC-SCNC: 11 MMOL/L — SIGNIFICANT CHANGE UP (ref 5–17)
BUN SERPL-MCNC: 8 MG/DL — SIGNIFICANT CHANGE UP (ref 7–23)
CALCIUM SERPL-MCNC: 12.1 MG/DL — HIGH (ref 8.4–10.5)
CHLORIDE SERPL-SCNC: 104 MMOL/L — SIGNIFICANT CHANGE UP (ref 96–108)
CO2 SERPL-SCNC: 26 MMOL/L — SIGNIFICANT CHANGE UP (ref 22–31)
CREAT SERPL-MCNC: 0.85 MG/DL — SIGNIFICANT CHANGE UP (ref 0.5–1.3)
GLUCOSE SERPL-MCNC: 98 MG/DL — SIGNIFICANT CHANGE UP (ref 70–99)
HCT VFR BLD CALC: 34.4 % — LOW (ref 34.5–45)
HGB BLD-MCNC: 11.2 G/DL — LOW (ref 11.5–15.5)
MAGNESIUM SERPL-MCNC: 1.7 MG/DL — SIGNIFICANT CHANGE UP (ref 1.6–2.6)
MCHC RBC-ENTMCNC: 32.4 GM/DL — SIGNIFICANT CHANGE UP (ref 32–36)
MCHC RBC-ENTMCNC: 33.6 PG — SIGNIFICANT CHANGE UP (ref 27–34)
MCV RBC AUTO: 104 FL — HIGH (ref 80–100)
PHOSPHATE SERPL-MCNC: 1.8 MG/DL — LOW (ref 2.5–4.5)
PLATELET # BLD AUTO: 395 K/UL — SIGNIFICANT CHANGE UP (ref 150–400)
POTASSIUM SERPL-MCNC: 3.3 MMOL/L — LOW (ref 3.5–5.3)
POTASSIUM SERPL-SCNC: 3.3 MMOL/L — LOW (ref 3.5–5.3)
RBC # BLD: 3.32 M/UL — LOW (ref 3.8–5.2)
RBC # FLD: 13.5 % — SIGNIFICANT CHANGE UP (ref 10.3–14.5)
SODIUM SERPL-SCNC: 141 MMOL/L — SIGNIFICANT CHANGE UP (ref 135–145)
WBC # BLD: 7.8 K/UL — SIGNIFICANT CHANGE UP (ref 3.8–10.5)
WBC # FLD AUTO: 7.8 K/UL — SIGNIFICANT CHANGE UP (ref 3.8–10.5)

## 2019-06-02 RX ORDER — SODIUM CHLORIDE 9 MG/ML
1000 INJECTION, SOLUTION INTRAVENOUS
Refills: 0 | Status: DISCONTINUED | OUTPATIENT
Start: 2019-06-02 | End: 2019-06-02

## 2019-06-02 RX ORDER — POTASSIUM PHOSPHATE, MONOBASIC POTASSIUM PHOSPHATE, DIBASIC 236; 224 MG/ML; MG/ML
30 INJECTION, SOLUTION INTRAVENOUS ONCE
Refills: 0 | Status: COMPLETED | OUTPATIENT
Start: 2019-06-02 | End: 2019-06-02

## 2019-06-02 RX ORDER — MAGNESIUM SULFATE 500 MG/ML
2 VIAL (ML) INJECTION ONCE
Refills: 0 | Status: COMPLETED | OUTPATIENT
Start: 2019-06-02 | End: 2019-06-02

## 2019-06-02 RX ORDER — POTASSIUM CHLORIDE 20 MEQ
20 PACKET (EA) ORAL
Refills: 0 | Status: COMPLETED | OUTPATIENT
Start: 2019-06-02 | End: 2019-06-02

## 2019-06-02 RX ORDER — DEXTROSE MONOHYDRATE, SODIUM CHLORIDE, AND POTASSIUM CHLORIDE 50; .745; 4.5 G/1000ML; G/1000ML; G/1000ML
1000 INJECTION, SOLUTION INTRAVENOUS
Refills: 0 | Status: DISCONTINUED | OUTPATIENT
Start: 2019-06-02 | End: 2019-06-03

## 2019-06-02 RX ADMIN — Medication 1 PATCH: at 11:47

## 2019-06-02 RX ADMIN — HEPARIN SODIUM 5000 UNIT(S): 5000 INJECTION INTRAVENOUS; SUBCUTANEOUS at 21:09

## 2019-06-02 RX ADMIN — CHLORHEXIDINE GLUCONATE 1 APPLICATION(S): 213 SOLUTION TOPICAL at 06:06

## 2019-06-02 RX ADMIN — Medication 100 MILLIGRAM(S): at 05:58

## 2019-06-02 RX ADMIN — POTASSIUM PHOSPHATE, MONOBASIC POTASSIUM PHOSPHATE, DIBASIC 83.33 MILLIMOLE(S): 236; 224 INJECTION, SOLUTION INTRAVENOUS at 15:30

## 2019-06-02 RX ADMIN — HEPARIN SODIUM 5000 UNIT(S): 5000 INJECTION INTRAVENOUS; SUBCUTANEOUS at 13:46

## 2019-06-02 RX ADMIN — Medication 20 MILLIEQUIVALENT(S): at 13:44

## 2019-06-02 RX ADMIN — Medication 50 GRAM(S): at 14:09

## 2019-06-02 RX ADMIN — HEPARIN SODIUM 5000 UNIT(S): 5000 INJECTION INTRAVENOUS; SUBCUTANEOUS at 05:58

## 2019-06-02 RX ADMIN — Medication 1 PATCH: at 13:46

## 2019-06-02 RX ADMIN — Medication 20 MILLIEQUIVALENT(S): at 15:31

## 2019-06-02 RX ADMIN — Medication 100 MILLIGRAM(S): at 21:09

## 2019-06-02 NOTE — PROGRESS NOTE ADULT - SUBJECTIVE AND OBJECTIVE BOX
UROLOGY DAILY PROGRESS NOTE:     Subjective: Patient seen and examined at bedside. No overnight events. Eating breakfast, tolerating.  Denies pain.       Objective:  Vital signs  T(F): , Max: 98.7 (06-02-19 @ 05:08)  HR: 82 (06-02-19 @ 05:08)  BP: 130/84 (06-02-19 @ 05:08)  SpO2: 99% (06-02-19 @ 05:08)  Wt(kg): --    I&O's Summary    01 Jun 2019 07:01  -  02 Jun 2019 07:00  --------------------------------------------------------  IN: 240 mL / OUT: 0 mL / NET: 240 mL        Gen: NAD  Pulm: No respiratory distress, no subcostal retractions  CV: RRR, no JVD  Abd: Soft, NT, ND  Back: No CVAT     Labs:  06-02  7.8   / 34.4  /0.85   06-01  8.20  / 32.5  /0.83                           11.2   7.8   )-----------( 395      ( 02 Jun 2019 07:18 )             34.4     06-02    141  |  104  |  8   ----------------------------<  98  3.3<L>   |  26  |  0.85    Ca    12.1<H>      02 Jun 2019 07:18  Phos  1.8     06-02  Mg     1.7     06-02          Urine Cx:  Culture - Urine (05.31.19 @ 06:05)    Culture - Urine:   NO GROWTH AT 24 HOURS    Specimen Source: URINE MIDSTREAM

## 2019-06-02 NOTE — CONSULT NOTE ADULT - SUBJECTIVE AND OBJECTIVE BOX
CHIEF COMPLAINT:Patient is a 54y old  Female who presents with a chief complaint of 9cm right renal mass (02 Jun 2019 10:18)      HISTORY OF PRESENT ILLNESS:  54 year old female with a medical history significant for asthma, and active heavy smoking, presenting as a transfer from [facility] for findings of 9cm right renal mass with weakness, fatigue, epigastric pain, nausea/vomiting, and extensive weight loss.  Transferred to [facility] to urology for workup and management of mass and CT findings.  cardiology is called for pre ep eval  denies any chest pain, sob, palpitation, dizziness or syncope.     PAST MEDICAL & SURGICAL HISTORY:  [place] [employer] (hard of hearing): uses hearing aides bilaterally  Asthma  History of tubal ligation          MEDICATIONS:  heparin  Injectable 5000 Unit(s) SubCutaneous every 8 hours      ALBUTerol    90 MICROgram(s) HFA Inhaler 2 Puff(s) Inhalation every 6 hours PRN    acetaminophen   Tablet .. 650 milliGRAM(s) Oral every 6 hours PRN    docusate sodium 100 milliGRAM(s) Oral three times a day  senna 2 Tablet(s) Oral at bedtime      chlorhexidine 4% Liquid 1 Application(s) Topical <User Schedule>  sodium chloride 0.9% lock flush 10 milliLiter(s) IV Push every 1 hour PRN      FAMILY HISTORY:  FH: ovarian cancer: Aunt      Non-contributory    SOCIAL HISTORY:    No tobacco, drugs or etoh    Allergies    No Known Allergies    Intolerances    	    REVIEW OF SYSTEMS:  as above  The rest of the 14 points ROS reviewed and except above they are unremarkable.        PHYSICAL EXAM:  T(C): 37.1 (06-02-19 @ 05:08), Max: 37.1 (06-02-19 @ 05:08)  HR: 82 (06-02-19 @ 05:08) (70 - 86)  BP: 130/84 (06-02-19 @ 05:08) (119/74 - 130/84)  RR: 18 (06-02-19 @ 05:08) (18 - 18)  SpO2: 99% (06-02-19 @ 05:08) (98% - 100%)  Wt(kg): --  I&O's Summary    01 Jun 2019 07:01  -  02 Jun 2019 07:00  --------------------------------------------------------  IN: 240 mL / OUT: 0 mL / NET: 240 mL    JVP: Normal  Neck: supple  Lung: clear   CV: S1 S2 , Murmur:  Abd: soft  Ext: No edema  neuro: Awake / alert  Psych: flat affect  Skin: normal      LABS/DATA:    TELEMETRY: 	    ECG:  	   	  CARDIAC MARKERS:                                      11.2   7.8   )-----------( 395      ( 02 Jun 2019 07:18 )             34.4     06-02    141  |  104  |  8   ----------------------------<  98  3.3<L>   |  26  |  0.85    Ca    12.1<H>      02 Jun 2019 07:18  Phos  1.8     06-02  Mg     1.7     06-02      proBNP:   Lipid Profile:   HgA1c:   TSH:

## 2019-06-02 NOTE — PROGRESS NOTE ADULT - ASSESSMENT
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  - labs as above  - encouraged PO diet, Ensure  - will continue TPN if patient does not meet caloric goals  - f/u CT surgery   - Will need Gen Surg- Rizvi, Vascular- Sastry involved with OR planning  - OOB  - DVT prophylaxis  - ERICA ordered   - Cardiology following 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  - labs as above  - encouraged PO diet, Ensure  - will continue TPN if patient does not meet caloric goals, poss GI consult  - f/u CT surgery   - Will need Gen Surg- Dmitri Vascular- Sastry involved with OR planning  - OOB  - DVT prophylaxis  - ERICA ordered   - Cardiology following

## 2019-06-03 LAB
ANION GAP SERPL CALC-SCNC: 11 MMOL/L — SIGNIFICANT CHANGE UP (ref 5–17)
ANION GAP SERPL CALC-SCNC: 7 MMOL/L — SIGNIFICANT CHANGE UP (ref 5–17)
BUN SERPL-MCNC: 7 MG/DL — SIGNIFICANT CHANGE UP (ref 7–23)
BUN SERPL-MCNC: 8 MG/DL — SIGNIFICANT CHANGE UP (ref 7–23)
CA-I BLD-SCNC: 1.49 MMOL/L — HIGH (ref 1.12–1.3)
CALCIUM SERPL-MCNC: 11.1 MG/DL — HIGH (ref 8.4–10.5)
CALCIUM SERPL-MCNC: 11.2 MG/DL — HIGH (ref 8.4–10.5)
CHLORIDE SERPL-SCNC: 107 MMOL/L — SIGNIFICANT CHANGE UP (ref 96–108)
CHLORIDE SERPL-SCNC: 108 MMOL/L — SIGNIFICANT CHANGE UP (ref 96–108)
CO2 SERPL-SCNC: 22 MMOL/L — SIGNIFICANT CHANGE UP (ref 22–31)
CO2 SERPL-SCNC: 23 MMOL/L — SIGNIFICANT CHANGE UP (ref 22–31)
CREAT SERPL-MCNC: 0.8 MG/DL — SIGNIFICANT CHANGE UP (ref 0.5–1.3)
CREAT SERPL-MCNC: 0.82 MG/DL — SIGNIFICANT CHANGE UP (ref 0.5–1.3)
GLUCOSE SERPL-MCNC: 102 MG/DL — HIGH (ref 70–99)
GLUCOSE SERPL-MCNC: 98 MG/DL — SIGNIFICANT CHANGE UP (ref 70–99)
HCT VFR BLD CALC: 33.7 % — LOW (ref 34.5–45)
HGB BLD-MCNC: 10.4 G/DL — LOW (ref 11.5–15.5)
MAGNESIUM SERPL-MCNC: 2 MG/DL — SIGNIFICANT CHANGE UP (ref 1.6–2.6)
MCHC RBC-ENTMCNC: 31 GM/DL — LOW (ref 32–36)
MCHC RBC-ENTMCNC: 32.5 PG — SIGNIFICANT CHANGE UP (ref 27–34)
MCV RBC AUTO: 105 FL — HIGH (ref 80–100)
PHOSPHATE SERPL-MCNC: 2 MG/DL — LOW (ref 2.5–4.5)
PLATELET # BLD AUTO: 370 K/UL — SIGNIFICANT CHANGE UP (ref 150–400)
POTASSIUM SERPL-MCNC: 4.3 MMOL/L — SIGNIFICANT CHANGE UP (ref 3.5–5.3)
POTASSIUM SERPL-MCNC: 5.9 MMOL/L — HIGH (ref 3.5–5.3)
POTASSIUM SERPL-MCNC: 6.9 MMOL/L — CRITICAL HIGH (ref 3.5–5.3)
POTASSIUM SERPL-SCNC: 4.3 MMOL/L — SIGNIFICANT CHANGE UP (ref 3.5–5.3)
POTASSIUM SERPL-SCNC: 5.9 MMOL/L — HIGH (ref 3.5–5.3)
POTASSIUM SERPL-SCNC: 6.9 MMOL/L — CRITICAL HIGH (ref 3.5–5.3)
RBC # BLD: 3.21 M/UL — LOW (ref 3.8–5.2)
RBC # FLD: 13.9 % — SIGNIFICANT CHANGE UP (ref 10.3–14.5)
SODIUM SERPL-SCNC: 138 MMOL/L — SIGNIFICANT CHANGE UP (ref 135–145)
SODIUM SERPL-SCNC: 140 MMOL/L — SIGNIFICANT CHANGE UP (ref 135–145)
WBC # BLD: 7.8 K/UL — SIGNIFICANT CHANGE UP (ref 3.8–10.5)
WBC # FLD AUTO: 7.8 K/UL — SIGNIFICANT CHANGE UP (ref 3.8–10.5)

## 2019-06-03 PROCEDURE — 93306 TTE W/DOPPLER COMPLETE: CPT | Mod: 26

## 2019-06-03 PROCEDURE — 99222 1ST HOSP IP/OBS MODERATE 55: CPT

## 2019-06-03 RX ADMIN — Medication 1 PATCH: at 05:46

## 2019-06-03 RX ADMIN — Medication 100 MILLIGRAM(S): at 17:16

## 2019-06-03 RX ADMIN — HEPARIN SODIUM 5000 UNIT(S): 5000 INJECTION INTRAVENOUS; SUBCUTANEOUS at 05:44

## 2019-06-03 RX ADMIN — Medication 100 MILLIGRAM(S): at 21:20

## 2019-06-03 RX ADMIN — CHLORHEXIDINE GLUCONATE 1 APPLICATION(S): 213 SOLUTION TOPICAL at 17:19

## 2019-06-03 RX ADMIN — HEPARIN SODIUM 5000 UNIT(S): 5000 INJECTION INTRAVENOUS; SUBCUTANEOUS at 21:20

## 2019-06-03 RX ADMIN — HEPARIN SODIUM 5000 UNIT(S): 5000 INJECTION INTRAVENOUS; SUBCUTANEOUS at 17:15

## 2019-06-03 RX ADMIN — SENNA PLUS 2 TABLET(S): 8.6 TABLET ORAL at 21:20

## 2019-06-03 NOTE — PROGRESS NOTE ADULT - SUBJECTIVE AND OBJECTIVE BOX
UROLOGY PA PROGRESS NOTE:     Subjective:  no acute events overnight, denies any pain overnight, improved PO intake yesterday     Objective:  Vital signs  T(F): , Max: 98.8 (06-02-19 @ 20:51)  HR: 77 (06-03-19 @ 05:10)  BP: 116/76 (06-03-19 @ 05:10)  SpO2: 99% (06-03-19 @ 05:10)  Wt(kg): --    Output     void 400cc    Physical Exam:  Gen: NAD  Abd: soft, NT/ND  : voiding     Labs:  06-03  7.8   / 33.7  /0.80   06-02  7.8   / 34.4  /0.85                           10.4   7.8   )-----------( 370      ( 03 Jun 2019 07:11 )             33.7     06-03    140  |  107  |  8   ----------------------------<  98  5.9<H>   |  22  |  0.80    Ca    11.1<H>      03 Jun 2019 07:11  Phos  2.0     06-03  Mg     2.0     06-03          urine cx: pending

## 2019-06-03 NOTE — PROGRESS NOTE ADULT - ASSESSMENT
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

## 2019-06-03 NOTE — PROGRESS NOTE ADULT - SUBJECTIVE AND OBJECTIVE BOX
Subjective: Patient seen and examined. No new events except as noted.     SUBJECTIVE/ROS:  No chest pain, dyspnea, palpitation, or dizziness.       MEDICATIONS:  MEDICATIONS  (STANDING):  chlorhexidine 4% Liquid 1 Application(s) Topical <User Schedule>  dextrose 5% + sodium chloride 0.45% with potassium chloride 20 mEq/L 1000 milliLiter(s) (100 mL/Hr) IV Continuous <Continuous>  docusate sodium 100 milliGRAM(s) Oral three times a day  heparin  Injectable 5000 Unit(s) SubCutaneous every 8 hours  nicotine - 21 mG/24Hr(s) Patch 1 patch Transdermal daily  senna 2 Tablet(s) Oral at bedtime      PHYSICAL EXAM:  T(C): 36.9 (06-03-19 @ 05:10), Max: 37.1 (06-02-19 @ 20:51)  HR: 77 (06-03-19 @ 05:10) (70 - 91)  BP: 116/76 (06-03-19 @ 05:10) (110/72 - 116/76)  RR: 17 (06-03-19 @ 05:10) (16 - 18)  SpO2: 99% (06-03-19 @ 05:10) (98% - 100%)  Wt(kg): --  I&O's Summary    02 Jun 2019 07:01  -  03 Jun 2019 07:00  --------------------------------------------------------  IN: 3280 mL / OUT: 700 mL / NET: 2580 mL            JVP: Normal  Neck: supple  Lung: clear   CV: S1 S2 , Murmur:  Abd: soft  Ext: No edema  neuro: Awake / alert  Psych: flat affect  Skin: normal``    LABS/DATA:    CARDIAC MARKERS:                                10.4   7.8   )-----------( 370      ( 03 Jun 2019 07:11 )             33.7     06-03    140  |  107  |  8   ----------------------------<  98  5.9<H>   |  22  |  0.80    Ca    11.1<H>      03 Jun 2019 07:11  Phos  2.0     06-03  Mg     2.0     06-03      proBNP:   Lipid Profile:   HgA1c:   TSH:     TELE:  EKG:

## 2019-06-04 RX ADMIN — Medication 100 MILLIGRAM(S): at 13:38

## 2019-06-04 RX ADMIN — HEPARIN SODIUM 5000 UNIT(S): 5000 INJECTION INTRAVENOUS; SUBCUTANEOUS at 13:37

## 2019-06-04 RX ADMIN — HEPARIN SODIUM 5000 UNIT(S): 5000 INJECTION INTRAVENOUS; SUBCUTANEOUS at 05:26

## 2019-06-04 RX ADMIN — SENNA PLUS 2 TABLET(S): 8.6 TABLET ORAL at 21:06

## 2019-06-04 RX ADMIN — Medication 100 MILLIGRAM(S): at 21:06

## 2019-06-04 RX ADMIN — HEPARIN SODIUM 5000 UNIT(S): 5000 INJECTION INTRAVENOUS; SUBCUTANEOUS at 21:06

## 2019-06-04 RX ADMIN — Medication 100 MILLIGRAM(S): at 05:26

## 2019-06-04 NOTE — CONSULT NOTE ADULT - ASSESSMENT
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    - Plan for OR Friday.  - TTE results appreciated. Medical/cardiac work-up prior to OR per cardiology consultant.  - Discussed with fellow.  - Will follow along    Vascular surgery   [id number]

## 2019-06-04 NOTE — PROGRESS NOTE ADULT - SUBJECTIVE AND OBJECTIVE BOX
UROLOGY PA PROGRESS NOTE:     Subjective:  no acute events overnight. no current complaints. denies any abdominal pain. tolerating diet and ensures.     Objective:  Vital signs  T(F): , Max: 99.4 (06-04-19 @ 05:23)  HR: 72 (06-04-19 @ 05:23)  BP: 115/76 (06-04-19 @ 05:23)  SpO2: 99% (06-04-19 @ 05:23)  Wt(kg): --    Output     06-03 @ 07:01  -  06-04 @ 07:00  --------------------------------------------------------  IN: 820 mL / OUT: 0 mL / NET: 820 mL        Physical Exam:  Gen: NAD  Abd: soft, NT/ND  : voiding     Labs:  06-03  x     / x     /0.82   06-03  7.8   / 33.7  /0.80                           10.4   7.8   )-----------( 370      ( 03 Jun 2019 07:11 )             33.7     06-03    x   |  x   |  x   ----------------------------<  x   4.3   |  x   |  x     Ca    11.2<H>      03 Jun 2019 11:00  Phos  2.0     06-03  Mg     2.0     06-03            Urine Cx:

## 2019-06-04 NOTE — PROGRESS NOTE ADULT - ASSESSMENT
9 cm R renal mass with lung mets and IVC tumor thrombus    - PO diet, Ensure  - calorie count   - f/u CT surgery, cardiology, surg onc, vascular   - OR planning for friday   - OOB  - DVT prophylaxis

## 2019-06-04 NOTE — CONSULT NOTE ADULT - SUBJECTIVE AND OBJECTIVE BOX
Vascul Surgery Consult  Consulting surgical team: Vascular  Consulting attending: Dr. Gregorio    HPI:  This is a 54 year old female with a medical history significant for asthma, and active heavy smoking, presenting as a transfer from [facility] for findings of 9cm right renal mass.  Patient reports she has been having epigastric pain, a poor appetite, and nausea and vomiting for at least 3 months.  She reports she cannot tolerate anything po, vomits up anything she ingests, denies hematemesis.  She reports she has lost nearly 100lbs within this time period as well suggesting that she was over 250lbs prior and is now around 140lbs.  She reports weakness and fatigue as well.  She presented to [facility] for increased vomiting, epigastric pain and resultant weakness on 5/30.  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  Denies fevers/chills.  Denies hematuria, increase in urgency/ frequency, dysuria, denies retention.  Denies chest pain.  Reports occasional shortness of breath most often associated with vomiting.  Denies pain or nausea currently.      PAST MEDICAL HISTORY:  Elim IRA (hard of hearing)  Asthma      PAST SURGICAL HISTORY:  History of tubal ligation      MEDICATIONS:  acetaminophen   Tablet .. 650 milliGRAM(s) Oral every 6 hours PRN  ALBUTerol    90 MICROgram(s) HFA Inhaler 2 Puff(s) Inhalation every 6 hours PRN  chlorhexidine 4% Liquid 1 Application(s) Topical <User Schedule>  docusate sodium 100 milliGRAM(s) Oral three times a day  heparin  Injectable 5000 Unit(s) SubCutaneous every 8 hours  nicotine - 21 mG/24Hr(s) Patch 1 patch Transdermal daily  senna 2 Tablet(s) Oral at bedtime  sodium chloride 0.9% lock flush 10 milliLiter(s) IV Push every 1 hour PRN      ALLERGIES:  No Known Allergies      VITALS & I/Os:  Vital Signs Last 24 Hrs  T(C): 37.1 (04 Jun 2019 14:00), Max: 37.4 (04 Jun 2019 05:23)  T(F): 98.7 (04 Jun 2019 14:00), Max: 99.4 (04 Jun 2019 05:23)  HR: 81 (04 Jun 2019 14:00) (67 - 81)  BP: 124/75 (04 Jun 2019 14:00) (115/76 - 136/85)  BP(mean): --  RR: 17 (04 Jun 2019 14:00) (17 - 18)  SpO2: 97% (04 Jun 2019 14:00) (97% - 100%)    I&O's Summary    03 Jun 2019 07:01  -  04 Jun 2019 07:00  --------------------------------------------------------  IN: 820 mL / OUT: 0 mL / NET: 820 mL    04 Jun 2019 07:01  -  04 Jun 2019 16:55  --------------------------------------------------------  IN: 350 mL / OUT: 500 mL / NET: -150 mL        PHYSICAL EXAM:  General: No acute distress  Respiratory: Nonlabored  Cardiovascular: RRR  Abdominal: Soft, nondistended, nontender.   Extremities: Warm    LABS:                        10.4   7.8   )-----------( 370      ( 03 Jun 2019 07:11 )             33.7     06-03    x   |  x   |  x   ----------------------------<  x   4.3   |  x   |  x     Ca    11.2<H>      03 Jun 2019 11:00  Phos  2.0     06-03  Mg     2.0     06-03

## 2019-06-04 NOTE — PROGRESS NOTE ADULT - SUBJECTIVE AND OBJECTIVE BOX
Subjective: Patient seen and examined. No new events except as noted.     SUBJECTIVE/ROS:  resting       MEDICATIONS:  MEDICATIONS  (STANDING):  chlorhexidine 4% Liquid 1 Application(s) Topical <User Schedule>  docusate sodium 100 milliGRAM(s) Oral three times a day  heparin  Injectable 5000 Unit(s) SubCutaneous every 8 hours  nicotine - 21 mG/24Hr(s) Patch 1 patch Transdermal daily  senna 2 Tablet(s) Oral at bedtime      PHYSICAL EXAM:  T(C): 37.4 (06-04-19 @ 05:23), Max: 37.4 (06-04-19 @ 05:23)  HR: 72 (06-04-19 @ 05:23) (64 - 76)  BP: 115/76 (06-04-19 @ 05:23) (115/76 - 137/89)  RR: 17 (06-04-19 @ 05:23) (17 - 18)  SpO2: 99% (06-04-19 @ 05:23) (99% - 100%)  Wt(kg): --  I&O's Summary    03 Jun 2019 07:01  -  04 Jun 2019 07:00  --------------------------------------------------------  IN: 820 mL / OUT: 0 mL / NET: 820 mL            JVP: Normal  Neck: supple  Lung: clear   CV: S1 S2 , Murmur:  Abd: soft  Ext: No edema  neuro: Awake / alert  Psych: flat affect  Skin: normal``    LABS/DATA:    CARDIAC MARKERS:                                10.4   7.8   )-----------( 370      ( 03 Jun 2019 07:11 )             33.7     06-03    x   |  x   |  x   ----------------------------<  x   4.3   |  x   |  x     Ca    11.2<H>      03 Jun 2019 11:00  Phos  2.0     06-03  Mg     2.0     06-03      proBNP:   Lipid Profile:   HgA1c:   TSH:     TELE:  EKG:  < from: Transthoracic Echocardiogram (06.03.19 @ 08:31) >    Patient name: LIZ CASEY  YOB: 1965   Age: 54 (F)   MR#: [id number]  Study Date: 6/3/2019  Location: [address] [place], [place] [id number]: Juliette Negron RDCS  Study quality: Technically fair  Referring Physician: Papo Ware MD  Blood Pressure: 136/73 mmHg  Height: 157 cm  Weight: 60 kg  BSA: 1.6 m2  ------------------------------------------------------------------------  PROCEDURE: Transthoracic echocardiogram with 2-D, M-Mode  and complete spectral and color flow Doppler.  INDICATION: Encounter for preprocedural cardiovascular  examination (Z01.810)  ------------------------------------------------------------------------  MEASUREMENTS: (See below)  ------------------------------------------------------------------------  OBSERVATIONS:  Mitral Valve: Normal mitral valve. Minimal mitral  regurgitation.  Aortic Root: Normal aortic root size. (Ao: 3.2 cm at the  sinuses of Valsalva).  Aortic Valve: Normal aortic valve.  Left Atrium: Normal left atrium.  Left Ventricle: Normal left ventricular systolic function.  No segmental wall motion abnormalities. Normal left  ventricular internal dimensions and wall thicknesses.  Normal diastolic function  Right Heart: Normal right atrium.  A large (>10 cm. in length) mass present in the inferior  cava. It can be seen extending into the right atrium (best  seen in the RV-inflow view).  Normal right ventricular size  and function. Normal tricuspid valve. Mild tricuspid  regurgitation. Normal pulmonic valve.  Pericardium/PleuraNormal pericardium with no pericardial  effusion.  Hemodynamic: No evidence of pulmonary hypertension.  No PFO seen with color Doppler.  ------------------------------------------------------------------------  CONCLUSIONS:  Normal left ventricular systolic function. No segmental  wall motion abnormalities.  A large (>10 cm. in length) mass present in the inferior  cava. It can be seen extending into the right atrium (best  seen in the RV-inflow view).  ------------------------------------------------------------------------  PROCEDURE DESCRIPTION: Transthoracic echocardiogram with  2-D, M-Mode and complete spectral and color flow Doppler.  ------------------------------------------------------------------------  ECHOCARDIOGRAPHIC EXAMINATION:  AORTIC ROOT:  Aortic Root (Leaflet): 3.2 cm  Aortic Root Index: 1.0  LEFT ATRIUM:  AP Dimensions: 3.7 cm  LA Volume Index: 41.00 cc/sqm  LA Volume: 66.0 cc  LEFT VENTRICLE:  LVIDd: 5.0 cm  LVIDs: 3.4 cm  Fraction Short: 32 %  IVS: 1.00  ILWT: 1.1 cm  RWT: 0.44  LV Mass: 194.0 gm  LV Mass Index: 121 gm/sqm  EF (Visual Estimate): 65%  HR and BP:  BP: 136/73  BSA: 1.60  TRICUSPID VALVE:  TR Velocity: 3.1 M/s  TR Gradient: 39.6 mm Hg  ------------------------------------------------------------------------  HEMODYNAMICS:  RA Pressure Estimate: 8  RVSP: 48  LA Pressure Estimate: < 20  PAS: 31  IVRT: 95 ms  ------------------------------------------------------------------------  COLOR FLOW and SPECIAL DOPPLER:  AORTIC VALVE:  LVOT:  LVOT Velocity: .9 M/sec  LVOT Peak Gradient Rest: 3 mm Hg  ------------------------------------------------------------------------  DIASTOLIC FUNCTION:  DT:218 ms  E/A: 1.40  e' Septal: 8.0 cm/s  E/e' Septal: 8.7  e' Lateral: 11.0 cm/s  E/e' Lateral: 6.3  MV E wave: 0.7m/s  MV A wave: 0.5 m/s  Septal a': 7.0 cm/s  Lateral a': 9.0 cm/s  ------------------------------------------------------------------------  Confirmed on  6/3/2019 - 16:19:19 by Jimmy Love M.D.  -----------------------------------------------------------------    < end of copied text >

## 2019-06-05 PROBLEM — Z00.00 ENCOUNTER FOR PREVENTIVE HEALTH EXAMINATION: Status: ACTIVE | Noted: 2019-06-05

## 2019-06-05 PROCEDURE — 93454 CORONARY ARTERY ANGIO S&I: CPT | Mod: 26,GC

## 2019-06-05 PROCEDURE — 99152 MOD SED SAME PHYS/QHP 5/>YRS: CPT | Mod: GC

## 2019-06-05 RX ADMIN — Medication 100 MILLIGRAM(S): at 05:36

## 2019-06-05 RX ADMIN — HEPARIN SODIUM 5000 UNIT(S): 5000 INJECTION INTRAVENOUS; SUBCUTANEOUS at 05:36

## 2019-06-05 RX ADMIN — SENNA PLUS 2 TABLET(S): 8.6 TABLET ORAL at 21:19

## 2019-06-05 RX ADMIN — Medication 100 MILLIGRAM(S): at 13:29

## 2019-06-05 RX ADMIN — Medication 100 MILLIGRAM(S): at 21:19

## 2019-06-05 RX ADMIN — HEPARIN SODIUM 5000 UNIT(S): 5000 INJECTION INTRAVENOUS; SUBCUTANEOUS at 13:29

## 2019-06-05 RX ADMIN — HEPARIN SODIUM 5000 UNIT(S): 5000 INJECTION INTRAVENOUS; SUBCUTANEOUS at 21:19

## 2019-06-05 NOTE — PROGRESS NOTE ADULT - SUBJECTIVE AND OBJECTIVE BOX
Subjective  requesting physical therapy   Objective    Vital signs  T(F): , Max: 98.9 (06-04-19 @ 21:18)  HR: 82 (06-05-19 @ 05:23)  BP: 138/95 (06-05-19 @ 05:23)  SpO2: 97% (06-05-19 @ 05:23)  Wt(kg): --    Output     06-04 @ 07:01  -  06-05 @ 07:00  --------------------------------------------------------  IN: 830 mL / OUT: 2450 mL / NET: -1620 mL        Gen awake alert nad axox3  Abd soft ntnd   Back no cvat bl    nonpalp bladder

## 2019-06-05 NOTE — PROGRESS NOTE ADULT - ASSESSMENT
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    - Plan for OR Friday.  - Will consent patient.   - Please order pre-op labs and make patient NPO for Friday  - TTE results appreciated. Medical/cardiac work-up prior to OR per cardiology consultant.  - Discussed with fellow and attending.   - Will follow along    Vascular surgery   [id number] 54F Hx asthma and extensive smoking history presents with metastatic renal cancer that extends into the IVC.    - Plan for OR Friday.  - Will consent patient for vena cava resection and reconstruction.  - Please order pre-op labs and make patient NPO for Friday.  - TTE results appreciated. Medical/cardiac work-up prior to OR per cardiology consultant.  - Discussed with fellow and attending.   - Will follow along    Vascular surgery   [id number]

## 2019-06-05 NOTE — PROGRESS NOTE ADULT - ASSESSMENT
9 cm R renal mass with lung mets and IVC tumor thrombus    - PO diet, Ensure  - calorie count   - physical therapy    - awaiting cardiac cath to be arranged by ctsx/ cardio  - mri to delineate abd tumor extension   - OR planning for friday   - DVT prophylaxis 9 cm R renal mass with lung mets and IVC tumor thrombus    - PO diet, Ensure  - calorie count   - physical therapy    - awaiting cardiac cath to be arranged by ctsx  - mri to delineate abd tumor extension   - OR planning for friday   - DVT prophylaxis

## 2019-06-05 NOTE — PROGRESS NOTE ADULT - ASSESSMENT
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

## 2019-06-05 NOTE — PROGRESS NOTE ADULT - SUBJECTIVE AND OBJECTIVE BOX
Surgery Progress Note    SUBJECTIVE: Pt seen and examined at bedside. Patient comfortable and in no-apparent distress. Patient doing well. Patient hard of hearing.     Vital Signs Last 24 Hrs  T(C): 36.8 (05 Jun 2019 05:23), Max: 37.2 (04 Jun 2019 21:18)  T(F): 98.3 (05 Jun 2019 05:23), Max: 98.9 (04 Jun 2019 21:18)  HR: 82 (05 Jun 2019 05:23) (67 - 82)  BP: 138/95 (05 Jun 2019 05:23) (123/80 - 138/95)  BP(mean): --  RR: 18 (05 Jun 2019 05:23) (17 - 18)  SpO2: 97% (05 Jun 2019 05:23) (97% - 100%)    Physical Exam:  General Appearance: Appears well, NAD  Respiratory: No labored breathing  CV: Pulse regularly present  Abdomen: Soft, nontense      LABS:    06-03    x   |  x   |  x   ----------------------------<  x   4.3   |  x   |  x     Ca    11.2<H>      03 Jun 2019 11:00            INs and OUTs:    06-04-19 @ 07:01  -  06-05-19 @ 07:00  --------------------------------------------------------  IN: 830 mL / OUT: 2450 mL / NET: -1620 mL

## 2019-06-05 NOTE — PROGRESS NOTE ADULT - SUBJECTIVE AND OBJECTIVE BOX
Subjective: Patient seen and examined. No new events except as noted.     SUBJECTIVE/ROS:        MEDICATIONS:  MEDICATIONS  (STANDING):  chlorhexidine 4% Liquid 1 Application(s) Topical <User Schedule>  docusate sodium 100 milliGRAM(s) Oral three times a day  heparin  Injectable 5000 Unit(s) SubCutaneous every 8 hours  nicotine - 21 mG/24Hr(s) Patch 1 patch Transdermal daily  senna 2 Tablet(s) Oral at bedtime      PHYSICAL EXAM:  T(C): 36.8 (06-05-19 @ 05:23), Max: 37.2 (06-04-19 @ 21:18)  HR: 82 (06-05-19 @ 05:23) (67 - 82)  BP: 138/95 (06-05-19 @ 05:23) (123/80 - 138/95)  RR: 18 (06-05-19 @ 05:23) (17 - 18)  SpO2: 97% (06-05-19 @ 05:23) (97% - 100%)  Wt(kg): --  I&O's Summary    04 Jun 2019 07:01  -  05 Jun 2019 07:00  --------------------------------------------------------  IN: 830 mL / OUT: 2450 mL / NET: -1620 mL            JVP: Normal  Neck: supple  Lung: clear   CV: S1 S2 , Murmur:  Abd: soft  Ext: No edema  neuro: Awake / alert  Psych: flat affect  Skin: normal``    LABS/DATA:    CARDIAC MARKERS:            06-03    x   |  x   |  x   ----------------------------<  x   4.3   |  x   |  x     Ca    11.2<H>      03 Jun 2019 11:00      proBNP:   Lipid Profile:   HgA1c:   TSH:     TELE:  EKG:

## 2019-06-06 ENCOUNTER — TRANSCRIPTION ENCOUNTER (OUTPATIENT)
Age: 54
End: 2019-06-06

## 2019-06-06 LAB
ALBUMIN SERPL ELPH-MCNC: 3.7 G/DL — SIGNIFICANT CHANGE UP (ref 3.3–5)
ALP SERPL-CCNC: 79 U/L — SIGNIFICANT CHANGE UP (ref 40–120)
ALT FLD-CCNC: 11 U/L — SIGNIFICANT CHANGE UP (ref 10–45)
ANION GAP SERPL CALC-SCNC: 10 MMOL/L — SIGNIFICANT CHANGE UP (ref 5–17)
APTT BLD: 33.3 SEC — SIGNIFICANT CHANGE UP (ref 27.5–36.3)
AST SERPL-CCNC: 8 U/L — LOW (ref 10–40)
BILIRUB SERPL-MCNC: 0.3 MG/DL — SIGNIFICANT CHANGE UP (ref 0.2–1.2)
BLD GP AB SCN SERPL QL: NEGATIVE — SIGNIFICANT CHANGE UP
BUN SERPL-MCNC: 11 MG/DL — SIGNIFICANT CHANGE UP (ref 7–23)
CALCIUM SERPL-MCNC: 12.8 MG/DL — HIGH (ref 8.4–10.5)
CHLORIDE SERPL-SCNC: 103 MMOL/L — SIGNIFICANT CHANGE UP (ref 96–108)
CO2 SERPL-SCNC: 24 MMOL/L — SIGNIFICANT CHANGE UP (ref 22–31)
CREAT SERPL-MCNC: 0.92 MG/DL — SIGNIFICANT CHANGE UP (ref 0.5–1.3)
GLUCOSE SERPL-MCNC: 104 MG/DL — HIGH (ref 70–99)
HCT VFR BLD CALC: 34.6 % — SIGNIFICANT CHANGE UP (ref 34.5–45)
HGB BLD-MCNC: 11.1 G/DL — LOW (ref 11.5–15.5)
INR BLD: 1.02 RATIO — SIGNIFICANT CHANGE UP (ref 0.88–1.16)
MAGNESIUM SERPL-MCNC: 2.1 MG/DL — SIGNIFICANT CHANGE UP (ref 1.6–2.6)
MCHC RBC-ENTMCNC: 32.2 GM/DL — SIGNIFICANT CHANGE UP (ref 32–36)
MCHC RBC-ENTMCNC: 33.5 PG — SIGNIFICANT CHANGE UP (ref 27–34)
MCV RBC AUTO: 104 FL — HIGH (ref 80–100)
MRSA PCR RESULT.: SIGNIFICANT CHANGE UP
PHOSPHATE SERPL-MCNC: 2.8 MG/DL — SIGNIFICANT CHANGE UP (ref 2.5–4.5)
PLATELET # BLD AUTO: 406 K/UL — HIGH (ref 150–400)
POTASSIUM SERPL-MCNC: 4.4 MMOL/L — SIGNIFICANT CHANGE UP (ref 3.5–5.3)
POTASSIUM SERPL-SCNC: 4.4 MMOL/L — SIGNIFICANT CHANGE UP (ref 3.5–5.3)
PROT SERPL-MCNC: 6.8 G/DL — SIGNIFICANT CHANGE UP (ref 6–8.3)
PROTHROM AB SERPL-ACNC: 11.6 SEC — SIGNIFICANT CHANGE UP (ref 10–12.9)
RBC # BLD: 3.32 M/UL — LOW (ref 3.8–5.2)
RBC # FLD: 13.6 % — SIGNIFICANT CHANGE UP (ref 10.3–14.5)
RH IG SCN BLD-IMP: POSITIVE — SIGNIFICANT CHANGE UP
RH IG SCN BLD-IMP: POSITIVE — SIGNIFICANT CHANGE UP
S AUREUS DNA NOSE QL NAA+PROBE: DETECTED
SODIUM SERPL-SCNC: 137 MMOL/L — SIGNIFICANT CHANGE UP (ref 135–145)
WBC # BLD: 7.4 K/UL — SIGNIFICANT CHANGE UP (ref 3.8–10.5)
WBC # FLD AUTO: 7.4 K/UL — SIGNIFICANT CHANGE UP (ref 3.8–10.5)

## 2019-06-06 PROCEDURE — 99024 POSTOP FOLLOW-UP VISIT: CPT

## 2019-06-06 PROCEDURE — 99232 SBSQ HOSP IP/OBS MODERATE 35: CPT

## 2019-06-06 PROCEDURE — 74185 MRA ABD W OR W/O CNTRST: CPT | Mod: 26

## 2019-06-06 PROCEDURE — 93010 ELECTROCARDIOGRAM REPORT: CPT

## 2019-06-06 RX ORDER — CHLORHEXIDINE GLUCONATE 213 G/1000ML
1 SOLUTION TOPICAL ONCE
Refills: 0 | Status: COMPLETED | OUTPATIENT
Start: 2019-06-06 | End: 2019-06-06

## 2019-06-06 RX ORDER — CHLORHEXIDINE GLUCONATE 213 G/1000ML
30 SOLUTION TOPICAL ONCE
Refills: 0 | Status: COMPLETED | OUTPATIENT
Start: 2019-06-06 | End: 2019-06-07

## 2019-06-06 RX ORDER — CEFUROXIME AXETIL 250 MG
1500 TABLET ORAL ONCE
Refills: 0 | Status: DISCONTINUED | OUTPATIENT
Start: 2019-06-06 | End: 2019-06-07

## 2019-06-06 RX ORDER — CHLORHEXIDINE GLUCONATE 213 G/1000ML
1 SOLUTION TOPICAL ONCE
Refills: 0 | Status: COMPLETED | OUTPATIENT
Start: 2019-06-06 | End: 2019-06-07

## 2019-06-06 RX ADMIN — Medication 100 MILLIGRAM(S): at 05:07

## 2019-06-06 RX ADMIN — HEPARIN SODIUM 5000 UNIT(S): 5000 INJECTION INTRAVENOUS; SUBCUTANEOUS at 05:06

## 2019-06-06 RX ADMIN — Medication 100 MILLIGRAM(S): at 13:15

## 2019-06-06 RX ADMIN — HEPARIN SODIUM 5000 UNIT(S): 5000 INJECTION INTRAVENOUS; SUBCUTANEOUS at 13:15

## 2019-06-06 RX ADMIN — CHLORHEXIDINE GLUCONATE 1 APPLICATION(S): 213 SOLUTION TOPICAL at 08:43

## 2019-06-06 NOTE — PROGRESS NOTE ADULT - SUBJECTIVE AND OBJECTIVE BOX
Subjective  feeling well without complaints   Objective    Vital signs  T(F): , Max: 98.7 (06-05-19 @ 21:50)  HR: 71 (06-06-19 @ 04:55)  BP: 120/76 (06-06-19 @ 04:55)  SpO2: 100% (06-06-19 @ 04:55)  Wt(kg): --    Output     06-04 @ 07:01  -  06-05 @ 07:00  --------------------------------------------------------  IN: 830 mL / OUT: 2450 mL / NET: -1620 mL    06-05 @ 07:01  -  06-06 @ 06:56  --------------------------------------------------------  IN: 950 mL / OUT: 1400 mL / NET: -450 mL        Gen awake alert nad axox3  Abd soft ntnd   Back no cvat bl    nonpalp bladder     Culture - Urine (05.31.19 @ 06:05)    Culture - Urine:   NO GROWTH AT 24 HOURS    Specimen Source: URINE MIDSTREAM    < from: Cardiac Cath Lab - Adult (06.05.19 @ 17:26) >  VENTRICLES: No left ventriculogram was performed.  CORONARY VESSELS: The coronary circulation is right dominant.  LM:   --  LM: Normal.  LAD:   --  LAD: Normal.  CX:   --  Circumflex: Normal.  RCA:   --  RCA: Normal.  COMPLICATIONS: There were no complications.  DIAGNOSTIC RECOMMENDATIONS: Medical management is recommended.    < end of copied text >

## 2019-06-06 NOTE — PROGRESS NOTE ADULT - ATTENDING COMMENTS
I again reviewed the planned surgery with the patient today. We discussed the nature of cytoreductive nephrectomy in the setting of known metastatic disease as being part of but not the complete curative therapy. 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 Risks of the surgery were discussed including high likelihood of blood transfusion, injury to surrounding structures, pulmonary embolism, potential need to ligate the vena cava, and infection. We also discussed the potential risk of death also associated with this type of operation. The risks and benefits of the cardiothoracic portion of the surgery including bypass require further procedure will be discussed with the patient by Dr. Whiteside.    I've also again reinforce the edges to the patient that after surgery is completed and she has recovered, there will be further treatment required including potential other surgery for the pulmonary nodules or systemic therapy with either targeted therapies or immunotherapy as depending on the surgical pathology results.    The patient indicates her understanding of this plan as it has been described to her. She is willing to move forward with the procedure which is planned for tomorrow.

## 2019-06-06 NOTE — PROGRESS NOTE ADULT - ASSESSMENT
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    Patient s/p normal cardiac cath yesterday    - NPOpMN  - Plan for OR Friday.  - Consent in Chart for vena cava resection and reconstruction.  - Please order pre-op labs  - Will follow along    Vascular surgery   [id number]

## 2019-06-06 NOTE — PROGRESS NOTE ADULT - ASSESSMENT
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

## 2019-06-06 NOTE — PROGRESS NOTE ADULT - SUBJECTIVE AND OBJECTIVE BOX
Surgery Progress Note    SUBJECTIVE: Pt seen and examined at bedside. Patient comfortable and in no-apparent distress. No nausea, vomiting, or pain      Vital Signs Last 24 Hrs  T(C): 37 (06 Jun 2019 04:55), Max: 37.1 (05 Jun 2019 21:50)  T(F): 98.6 (06 Jun 2019 04:55), Max: 98.7 (05 Jun 2019 21:50)  HR: 71 (06 Jun 2019 04:55) (60 - 85)  BP: 120/76 (06 Jun 2019 04:55) (120/76 - 129/84)  BP(mean): --  RR: 18 (06 Jun 2019 04:55) (17 - 18)  SpO2: 100% (06 Jun 2019 04:55) (98% - 100%)    Physical Exam:  General Appearance: Appears well, NAD  Respiratory: No labored breathing  CV: Pulse regularly present  Abdomen: Soft, nontense      LABS:                INs and OUTs:    06-04-19 @ 07:01  -  06-05-19 @ 07:00  --------------------------------------------------------  IN: 830 mL / OUT: 2450 mL / NET: -1620 mL    06-05-19 @ 07:01  -  06-06-19 @ 06:19  --------------------------------------------------------  IN: 830 mL / OUT: 1250 mL / NET: -420 mL

## 2019-06-06 NOTE — CONSULT NOTE ADULT - REASON FOR ADMISSION
9cm right renal mass, IVC tumor thrombus
9cm right renal mass, IVC tumor thrombus
9cm right renal mass
sob/ nausea/ weight loss/ LE edema

## 2019-06-06 NOTE — PHYSICAL THERAPY INITIAL EVALUATION ADULT - PRECAUTIONS/LIMITATIONS, REHAB EVAL
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 He is planned to undergo right nephrectomy and vena cava reconstruction on 6/7. CT Chest 5/31: Bilateral pulmonary nodules as described above likely representing metastatic disease. 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 He is planned to undergo right nephrectomy and vena cava reconstruction on 6/7. CT Chest 5/31: Bilateral pulmonary nodules as described above likely representing metastatic disease./no known precautions/limitations

## 2019-06-06 NOTE — PROGRESS NOTE ADULT - SUBJECTIVE AND OBJECTIVE BOX
Subjective: Patient seen and examined. No new events except as noted.     SUBJECTIVE/ROS:  resting  No chest pain, dyspnea, palpitation, or dizziness.       MEDICATIONS:  MEDICATIONS  (STANDING):  chlorhexidine 4% Liquid 1 Application(s) Topical <User Schedule>  chlorhexidine 4% Liquid 1 Application(s) Topical once  docusate sodium 100 milliGRAM(s) Oral three times a day  heparin  Injectable 5000 Unit(s) SubCutaneous every 8 hours  nicotine - 21 mG/24Hr(s) Patch 1 patch Transdermal daily  senna 2 Tablet(s) Oral at bedtime      PHYSICAL EXAM:  T(C): 37 (06-06-19 @ 04:55), Max: 37.1 (06-05-19 @ 21:50)  HR: 71 (06-06-19 @ 04:55) (60 - 85)  BP: 120/76 (06-06-19 @ 04:55) (120/76 - 129/84)  RR: 18 (06-06-19 @ 04:55) (17 - 18)  SpO2: 100% (06-06-19 @ 04:55) (98% - 100%)  Wt(kg): --  I&O's Summary    05 Jun 2019 07:01  -  06 Jun 2019 07:00  --------------------------------------------------------  IN: 950 mL / OUT: 1400 mL / NET: -450 mL            JVP: Normal  Neck: supple  Lung: clear   CV: S1 S2 , Murmur:  Abd: soft  Ext: No edema  neuro: Awake / alert  Psych: flat affect  Skin: normal``    LABS/DATA:    CARDIAC MARKERS:                                11.1   7.4   )-----------( 406      ( 06 Jun 2019 07:08 )             34.6           proBNP:   Lipid Profile:   HgA1c:   TSH:     TELE:  EKG:

## 2019-06-06 NOTE — PROGRESS NOTE ADULT - SUBJECTIVE AND OBJECTIVE BOX
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 Ware, Dr. Gregorio, and Dr. Whiteside                          11.1   7.4   )-----------( 406      ( 06 Jun 2019 07:08 )             34.6       06-06    137  |  103  |  11  ----------------------------<  104<H>  4.4   |  24  |  0.92    Ca    12.8<H>      06 Jun 2019 07:07  Phos  2.8     06-06  Mg     2.1     06-06    TPro  6.8  /  Alb  3.7  /  TBili  0.3  /  DBili  x   /  AST  8<L>  /  ALT  11  /  AlkPhos  79  06-06    PT/INR - ( 06 Jun 2019 07:08 )   PT: 11.6 sec;   INR: 1.02 ratio         PTT - ( 06 Jun 2019 07:08 )  PTT:33.3 sec    UCx: Culture - Urine (05.31.19 @ 06:05)    Culture - Urine:   NO GROWTH AT 24 HOURS    Specimen Source: URINE MIDSTREAM    < from: Transthoracic Echocardiogram (06.03.19 @ 08:31) >  CONCLUSIONS:  Normal left ventricular systolic function. No segmental  wall motion abnormalities.  A large (>10 cm. in length) mass present in the inferior  cava. It can be seen extending into the right atrium (best  seen in the RV-inflow view).    < end of copied text >    CXR: < from: CT Chest No Cont (05.31.19 @ 20:13) >  IMPRESSION:     Bilateral pulmonary nodules as described above likely representing   metastatic disease.    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 Ware, Dr. Gregorio, Dr. Hannah and Dr. Whiteside                          11.1   7.4   )-----------( 406      ( 06 Jun 2019 07:08 )             34.6       06-06    137  |  103  |  11  ----------------------------<  104<H>  4.4   |  24  |  0.92    Ca    12.8<H>      06 Jun 2019 07:07  Phos  2.8     06-06  Mg     2.1     06-06    TPro  6.8  /  Alb  3.7  /  TBili  0.3  /  DBili  x   /  AST  8<L>  /  ALT  11  /  AlkPhos  79  06-06    PT/INR - ( 06 Jun 2019 07:08 )   PT: 11.6 sec;   INR: 1.02 ratio         PTT - ( 06 Jun 2019 07:08 )  PTT:33.3 sec    UCx: Culture - Urine (05.31.19 @ 06:05)    Culture - Urine:   NO GROWTH AT 24 HOURS    Specimen Source: URINE MIDSTREAM    EKG: NSR    < from: Transthoracic Echocardiogram (06.03.19 @ 08:31) >  CONCLUSIONS:  Normal left ventricular systolic function. No segmental  wall motion abnormalities.  A large (>10 cm. in length) mass present in the inferior  cava. It can be seen extending into the right atrium (best  seen in the RV-inflow view).    < end of copied text >    Chest CT: < from: CT Chest No Cont (05.31.19 @ 20:13) >  IMPRESSION:     Bilateral pulmonary nodules as described above likely representing   metastatic disease.    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 Ware, Dr. Gregorio, Dr. Hannah and Dr. Whiteside                          11.1   7.4   )-----------( 406      ( 06 Jun 2019 07:08 )             34.6       06-06    137  |  103  |  11  ----------------------------<  104<H>  4.4   |  24  |  0.92    Ca    12.8<H>      06 Jun 2019 07:07  Phos  2.8     06-06  Mg     2.1     06-06    TPro  6.8  /  Alb  3.7  /  TBili  0.3  /  DBili  x   /  AST  8<L>  /  ALT  11  /  AlkPhos  79  06-06    PT/INR - ( 06 Jun 2019 07:08 )   PT: 11.6 sec;   INR: 1.02 ratio         PTT - ( 06 Jun 2019 07:08 )  PTT:33.3 sec    UCx: Culture - Urine (05.31.19 @ 06:05)    Culture - Urine:   NO GROWTH AT 24 HOURS    Specimen Source: URINE MIDSTREAM    EKG: NSR    < from: Transthoracic Echocardiogram (06.03.19 @ 08:31) >  CONCLUSIONS:  Normal left ventricular systolic function. No segmental  wall motion abnormalities.  A large (>10 cm. in length) mass present in the inferior  cava. It can be seen extending into the right atrium (best  seen in the RV-inflow view).    < end of copied text >    Chest CT: < from: CT Chest No Cont (05.31.19 @ 20:13) >  IMPRESSION:     Bilateral pulmonary nodules as described above likely representing   metastatic disease.    < end of copied text >    Orders:  NPO after midnight  IVF after midnight   Consent obtained by teams   Urology consent in chart. Patient advised on need for blood products and agreed to blood product transfusions as needed.   Clearance obtained   Type & Screen  6 units on hold

## 2019-06-06 NOTE — CONSULT NOTE ADULT - ASSESSMENT
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     - Transplant surgery will assist in the OR to expose the suprahepatic IVC  - Consent for hepatic mobilization and vena cava exposure in the chart    Pager [phone]

## 2019-06-06 NOTE — CONSULT NOTE ADULT - CONSULT REASON
cardiac eval
hepatic mobilization and vena cava exposure for resection of IVC tumor
renal mass with IVC involvement
loose teeth
IVC tumor thrombus

## 2019-06-06 NOTE — CONSULT NOTE ADULT - SUBJECTIVE AND OBJECTIVE BOX
Transplant/Hepatobiliary Surgery Consult Note  Pager [phone]    HPI:        PAST MEDICAL & SURGICAL HISTORY:  [place] (hard of hearing): uses hearing aides bilaterally  Asthma  History of tubal ligation      ALLERGIES:    HOME MEDICATIONS:    MEDICATIONS  (STANDING):  cefuroxime  IVPB 1500 milliGRAM(s) IV Intermittent once  chlorhexidine 0.12% Liquid 30 milliLiter(s) Swish and Spit once  chlorhexidine 4% Liquid 1 Application(s) Topical once  chlorhexidine 4% Liquid 1 Application(s) Topical <User Schedule>  docusate sodium 100 milliGRAM(s) Oral three times a day  heparin  Injectable 5000 Unit(s) SubCutaneous every 8 hours  nicotine - 21 mG/24Hr(s) Patch 1 patch Transdermal daily  senna 2 Tablet(s) Oral at bedtime      SOCIAL HISTORY:    FAMILY HISTORY:    ___________________________________________  REVIEW OF SYSTEMS:    ___________________________________________  PHYSICAL EXAM:  Vital Signs Last 24 Hrs  T(C): 36.9 (06 Jun 2019 14:06), Max: 37.1 (05 Jun 2019 21:50)  T(F): 98.4 (06 Jun 2019 14:06), Max: 98.7 (05 Jun 2019 21:50)  HR: 71 (06 Jun 2019 14:06) (67 - 71)  BP: 113/73 (06 Jun 2019 14:06) (104/63 - 122/72)  BP(mean): --  RR: 18 (06 Jun 2019 14:06) (18 - 18)  SpO2: 98% (06 Jun 2019 14:06) (98% - 100%)CAPILLARY BLOOD GLUCOSE        I&O's Detail    05 Jun 2019 07:01  -  06 Jun 2019 07:00  --------------------------------------------------------  IN:    Oral Fluid: 950 mL  Total IN: 950 mL    OUT:    Voided: 1400 mL  Total OUT: 1400 mL    Total NET: -450 mL      06 Jun 2019 07:01  -  06 Jun 2019 15:03  --------------------------------------------------------  IN:    Oral Fluid: 600 mL  Total IN: 600 mL    OUT:    Voided: 200 mL  Total OUT: 200 mL    Total NET: 400 mL          General: A&O x3, NAD  Neuro: CN II-XII intact, motor and sensory grossly intact with no focal deficits.  HEENT: Normocephalic, atraumatic, EOMI, anicteric sclerae.  Respiratory: Clear to auscultation bilaterally, breathing non-labored.  CVS: Regular rate and rhythm  Abdomen: Soft, nondistended, nontender. No rebound, no guarding, No palpable organomegaly or masses.  Extremities: Warm bilaterally with palpable pulses.  MSK: Intact ROM.  ____________________________________________  LABS:  CBC Full  -  ( 06 Jun 2019 07:08 )  WBC Count : 7.4 K/uL  RBC Count : 3.32 M/uL  Hemoglobin : 11.1 g/dL  Hematocrit : 34.6 %  Platelet Count - Automated : 406 K/uL  Mean Cell Volume : 104.0 fl  Mean Cell Hemoglobin : 33.5 pg  Mean Cell Hemoglobin Concentration : 32.2 gm/dL    06-06    137  |  103  |  11  ----------------------------<  104<H>  4.4   |  24  |  0.92    Ca    12.8<H>      06 Jun 2019 07:07  Phos  2.8     06-06  Mg     2.1     06-06    TPro  6.8  /  Alb  3.7  /  TBili  0.3  /  DBili  x   /  AST  8<L>  /  ALT  11  /  AlkPhos  79  06-06    LIVER FUNCTIONS - ( 06 Jun 2019 07:07 )  Alb: 3.7 g/dL / Pro: 6.8 g/dL / ALK PHOS: 79 U/L / ALT: 11 U/L / AST: 8 U/L / GGT: x           PT/INR - ( 06 Jun 2019 07:08 )   PT: 11.6 sec;   INR: 1.02 ratio         PTT - ( 06 Jun 2019 07:08 )  PTT:33.3 sec      ____________________________________________  RADIOLOGY: Transplant/Hepatobiliary Surgery Consult Note  Pager [phone]    HPI:  54-year-old female with asthma, active smoker presents from OSH for a 9cm right renal mass with tumor thrombus extending to the intrahepatic IVC and metastatic pulmonary nodules. She is planned to undergo right nephrectomy and vena cava reconstruction on 6/7. Transplant/Hepatobiliary Surgery consulted for hepatic mobilization and vena cava exposure.  Patient was seen at OSH for symptoms of nausea, epigastric pain, and weakness. Reports recent weight loss.       PAST MEDICAL & SURGICAL HISTORY:  [place] (hard of hearing): uses hearing aides bilaterally  Asthma  History of tubal ligation  No surgical history      ALLERGIES:  NKA      HOME MEDICATIONS:  acetaminophen 325 mg oral tablet: 2 tab(s) orally every 6 hours, As needed, Temp greater or equal to 38C (100.4F), Mild Pain (1 - 3) (06 Jun 2019 14:47)  albuterol 90 mcg/inh inhalation aerosol: 2 puff(s) inhaled every 6 hours, As needed, Shortness of Breath and/or Wheezing (06 Jun 2019 14:47)  docusate sodium 100 mg oral capsule: 1 cap(s) orally 3 times a day (06 Jun 2019 14:47)  heparin: 5000 unit(s) subcutaneous every 8 hours (06 Jun 2019 14:47)  nicotine 21 mg/24 hr transdermal film, extended release: 1 patch transdermal once a day (06 Jun 2019 14:47)  ondansetron 2 mg/mL injectable solution: 4 milligram(s) injectable every 6 hours, As Needed (06 Jun 2019 14:47)  senna oral tablet: 2 tab(s) orally once a day (at bedtime) (06 Jun 2019 14:47)    MEDICATIONS  (STANDING):  cefuroxime  IVPB 1500 milliGRAM(s) IV Intermittent once  chlorhexidine 0.12% Liquid 30 milliLiter(s) Swish and Spit once  chlorhexidine 4% Liquid 1 Application(s) Topical once  chlorhexidine 4% Liquid 1 Application(s) Topical <User Schedule>  docusate sodium 100 milliGRAM(s) Oral three times a day  heparin  Injectable 5000 Unit(s) SubCutaneous every 8 hours  nicotine - 21 mG/24Hr(s) Patch 1 patch Transdermal daily  senna 2 Tablet(s) Oral at bedtime      SOCIAL HISTORY:  Lives with 2 daughters and 3 grandkids  	Repairs tires, 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  	Smokes a pack and a half a day since the age of 18      FAMILY HISTORY:  ovarian cancer - Aunt  ___________________________________________  REVIEW OF SYSTEMS:  Constitutional: No fevers, chills, no recent weight loss  ENMT: No changes in hearing, no changes in vision, no sore throat, no cough  Respiratory: No shortness of breath  Cardiovascular: No chest pain, palpitations  Gastrointestinal: No abdominal pain, no diarrhea/constipation  Genitourinary: No dysuria, frequency, or urgency    Extremities: No joint swelling, no limited range of movement  Neurological: No paresthesia  Skin: No rashes  ___________________________________________  PHYSICAL EXAM:  Vital Signs Last 24 Hrs  T(C): 36.9 (06 Jun 2019 14:06), Max: 37.1 (05 Jun 2019 21:50)  T(F): 98.4 (06 Jun 2019 14:06), Max: 98.7 (05 Jun 2019 21:50)  HR: 71 (06 Jun 2019 14:06) (67 - 71)  BP: 113/73 (06 Jun 2019 14:06) (104/63 - 122/72)  BP(mean): --  RR: 18 (06 Jun 2019 14:06) (18 - 18)  SpO2: 98% (06 Jun 2019 14:06) (98% - 100%)CAPILLARY BLOOD GLUCOSE        I&O's Detail    05 Jun 2019 07:01  -  06 Jun 2019 07:00  --------------------------------------------------------  IN:    Oral Fluid: 950 mL  Total IN: 950 mL    OUT:    Voided: 1400 mL  Total OUT: 1400 mL    Total NET: -450 mL      06 Jun 2019 07:01  -  06 Jun 2019 15:03  --------------------------------------------------------  IN:    Oral Fluid: 600 mL  Total IN: 600 mL    OUT:    Voided: 200 mL  Total OUT: 200 mL    Total NET: 400 mL          General: A&O x3, NAD  Neuro: CN II-XII intact, motor and sensory grossly intact with no focal deficits.  HEENT: Normocephalic, atraumatic, EOMI, anicteric sclerae.  Respiratory: Clear to auscultation bilaterally, breathing non-labored.  CVS: Regular rate and rhythm  Abdomen: Soft, nondistended, nontender.   Extremities: Warm bilaterally.  MSK: Intact ROM.  ____________________________________________  LABS:  CBC Full  -  ( 06 Jun 2019 07:08 )  WBC Count : 7.4 K/uL  RBC Count : 3.32 M/uL  Hemoglobin : 11.1 g/dL  Hematocrit : 34.6 %  Platelet Count - Automated : 406 K/uL  Mean Cell Volume : 104.0 fl  Mean Cell Hemoglobin : 33.5 pg  Mean Cell Hemoglobin Concentration : 32.2 gm/dL    06-06    137  |  103  |  11  ----------------------------<  104<H>  4.4   |  24  |  0.92    Ca    12.8<H>      06 Jun 2019 07:07  Phos  2.8     06-06  Mg     2.1     06-06    TPro  6.8  /  Alb  3.7  /  TBili  0.3  /  DBili  x   /  AST  8<L>  /  ALT  11  /  AlkPhos  79  06-06    LIVER FUNCTIONS - ( 06 Jun 2019 07:07 )  Alb: 3.7 g/dL / Pro: 6.8 g/dL / ALK PHOS: 79 U/L / ALT: 11 U/L / AST: 8 U/L / GGT: x           PT/INR - ( 06 Jun 2019 07:08 )   PT: 11.6 sec;   INR: 1.02 ratio         PTT - ( 06 Jun 2019 07:08 )  PTT:33.3 sec      ____________________________________________  RADIOLOGY:  CT Chest No Cont (05.31.19 @ 20:13)   FINDINGS:    LUNGS AND AIRWAYS: Patent central airways.  Scattered bilateral pulmonary   nodules likely representing metastatic disease. For reference:  1.  A right middle lobe nodule measures 1.3 cm (2, 64).  2.  A left lower lobe nodule measures 6 mm (2, 61).    PLEURA: No pleural effusion or pneumothorax.    MEDIASTINUM AND DERICK: No lymphadenopathy.    VESSELS: Within normal limits. Left-sided PICC with tip in the SVC.    HEART: Cardiomegaly. No pericardial effusion.    CHEST WALL AND LOWER NECK: Within normal limits.    VISUALIZED UPPER ABDOMEN: Right renal mass extending into the IVC is   partially visualized.    BONES: No evidence of osseous metastatic disease.    IMPRESSION:     Bilateral pulmonary nodules as described above likely representing   metastatic disease. Transplant/Hepatobiliary Surgery Consult Note  Pager [phone]    HPI:  54-year-old female with asthma, active smoker presents from OSH for a 9cm right renal mass with tumor thrombus extending to the intrahepatic IVC and metastatic pulmonary nodules. She is planned to undergo right nephrectomy and vena cava reconstruction on 6/7. Transplant Surgery consulted for hepatic mobilization and vena cava exposure.  Patient was seen at OSH for symptoms of nausea, epigastric pain, and weakness. Reports recent weight loss.       PAST MEDICAL & SURGICAL HISTORY:  [place] (hard of hearing): uses hearing aides bilaterally  Asthma  History of tubal ligation  No surgical history      ALLERGIES:  NKA      HOME MEDICATIONS:  acetaminophen 325 mg oral tablet: 2 tab(s) orally every 6 hours, As needed, Temp greater or equal to 38C (100.4F), Mild Pain (1 - 3) (06 Jun 2019 14:47)  albuterol 90 mcg/inh inhalation aerosol: 2 puff(s) inhaled every 6 hours, As needed, Shortness of Breath and/or Wheezing (06 Jun 2019 14:47)  docusate sodium 100 mg oral capsule: 1 cap(s) orally 3 times a day (06 Jun 2019 14:47)  heparin: 5000 unit(s) subcutaneous every 8 hours (06 Jun 2019 14:47)  nicotine 21 mg/24 hr transdermal film, extended release: 1 patch transdermal once a day (06 Jun 2019 14:47)  ondansetron 2 mg/mL injectable solution: 4 milligram(s) injectable every 6 hours, As Needed (06 Jun 2019 14:47)  senna oral tablet: 2 tab(s) orally once a day (at bedtime) (06 Jun 2019 14:47)    MEDICATIONS  (STANDING):  cefuroxime  IVPB 1500 milliGRAM(s) IV Intermittent once  chlorhexidine 0.12% Liquid 30 milliLiter(s) Swish and Spit once  chlorhexidine 4% Liquid 1 Application(s) Topical once  chlorhexidine 4% Liquid 1 Application(s) Topical <User Schedule>  docusate sodium 100 milliGRAM(s) Oral three times a day  heparin  Injectable 5000 Unit(s) SubCutaneous every 8 hours  nicotine - 21 mG/24Hr(s) Patch 1 patch Transdermal daily  senna 2 Tablet(s) Oral at bedtime      SOCIAL HISTORY:  Lives with 2 daughters and 3 grandkids  	Repairs tires, 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  	Smokes a pack and a half a day since the age of 18      FAMILY HISTORY:  ovarian cancer - Aunt  ___________________________________________  REVIEW OF SYSTEMS:  Constitutional: No fevers, chills, no recent weight loss  ENMT: No changes in hearing, no changes in vision, no sore throat, no cough  Respiratory: No shortness of breath  Cardiovascular: No chest pain, palpitations  Gastrointestinal: No abdominal pain, no diarrhea/constipation  Genitourinary: No dysuria, frequency, or urgency    Extremities: No joint swelling, no limited range of movement  Neurological: No paresthesia  Skin: No rashes  ___________________________________________  PHYSICAL EXAM:  Vital Signs Last 24 Hrs  T(C): 36.9 (06 Jun 2019 14:06), Max: 37.1 (05 Jun 2019 21:50)  T(F): 98.4 (06 Jun 2019 14:06), Max: 98.7 (05 Jun 2019 21:50)  HR: 71 (06 Jun 2019 14:06) (67 - 71)  BP: 113/73 (06 Jun 2019 14:06) (104/63 - 122/72)  BP(mean): --  RR: 18 (06 Jun 2019 14:06) (18 - 18)  SpO2: 98% (06 Jun 2019 14:06) (98% - 100%)CAPILLARY BLOOD GLUCOSE        I&O's Detail    05 Jun 2019 07:01  -  06 Jun 2019 07:00  --------------------------------------------------------  IN:    Oral Fluid: 950 mL  Total IN: 950 mL    OUT:    Voided: 1400 mL  Total OUT: 1400 mL    Total NET: -450 mL      06 Jun 2019 07:01  -  06 Jun 2019 15:03  --------------------------------------------------------  IN:    Oral Fluid: 600 mL  Total IN: 600 mL    OUT:    Voided: 200 mL  Total OUT: 200 mL    Total NET: 400 mL          General: A&O x3, NAD  Neuro: CN II-XII intact, motor and sensory grossly intact with no focal deficits.  HEENT: Normocephalic, atraumatic, EOMI, anicteric sclerae.  Respiratory: Clear to auscultation bilaterally, breathing non-labored.  CVS: Regular rate and rhythm  Abdomen: Soft, nondistended, nontender.   Extremities: Warm bilaterally.  MSK: Intact ROM.  ____________________________________________  LABS:  CBC Full  -  ( 06 Jun 2019 07:08 )  WBC Count : 7.4 K/uL  RBC Count : 3.32 M/uL  Hemoglobin : 11.1 g/dL  Hematocrit : 34.6 %  Platelet Count - Automated : 406 K/uL  Mean Cell Volume : 104.0 fl  Mean Cell Hemoglobin : 33.5 pg  Mean Cell Hemoglobin Concentration : 32.2 gm/dL    06-06    137  |  103  |  11  ----------------------------<  104<H>  4.4   |  24  |  0.92    Ca    12.8<H>      06 Jun 2019 07:07  Phos  2.8     06-06  Mg     2.1     06-06    TPro  6.8  /  Alb  3.7  /  TBili  0.3  /  DBili  x   /  AST  8<L>  /  ALT  11  /  AlkPhos  79  06-06    LIVER FUNCTIONS - ( 06 Jun 2019 07:07 )  Alb: 3.7 g/dL / Pro: 6.8 g/dL / ALK PHOS: 79 U/L / ALT: 11 U/L / AST: 8 U/L / GGT: x           PT/INR - ( 06 Jun 2019 07:08 )   PT: 11.6 sec;   INR: 1.02 ratio         PTT - ( 06 Jun 2019 07:08 )  PTT:33.3 sec      ____________________________________________  RADIOLOGY:  CT Chest No Cont (05.31.19 @ 20:13)   FINDINGS:    LUNGS AND AIRWAYS: Patent central airways.  Scattered bilateral pulmonary   nodules likely representing metastatic disease. For reference:  1.  A right middle lobe nodule measures 1.3 cm (2, 64).  2.  A left lower lobe nodule measures 6 mm (2, 61).    PLEURA: No pleural effusion or pneumothorax.    MEDIASTINUM AND DERICK: No lymphadenopathy.    VESSELS: Within normal limits. Left-sided PICC with tip in the SVC.    HEART: Cardiomegaly. No pericardial effusion.    CHEST WALL AND LOWER NECK: Within normal limits.    VISUALIZED UPPER ABDOMEN: Right renal mass extending into the IVC is   partially visualized.    BONES: No evidence of osseous metastatic disease.    IMPRESSION:     Bilateral pulmonary nodules as described above likely representing   metastatic disease.

## 2019-06-06 NOTE — PROGRESS NOTE ADULT - SUBJECTIVE AND OBJECTIVE BOX
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

## 2019-06-06 NOTE — PROGRESS NOTE ADULT - ASSESSMENT
This is a 54 year old female with a medical history significant for asthma, and active heavy smoking, presenting as a transfer from [facility] for findings of 9cm right renal mass.  Patient reports she has been having epigastric pain, a poor appetite, and nausea and vomiting for at least 3 months.  She reports she cannot tolerate anything po, vomits up anything she ingests, denies hematemesis.  She reports she has lost nearly 100lbs within this time period as well suggesting that she was over 250lbs prior and is now around 140lbs.    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   Underwent Cardiac Urologic and vascular workup now scheduled for removal of renal mass in am with possible Circ arrest.

## 2019-06-06 NOTE — PROGRESS NOTE ADULT - SUBJECTIVE AND OBJECTIVE BOX
Cardiac Surgery Pre-op Note:    CC: Patient is a 54y old  Female who presents with a chief complaint of 9cm right renal mass, IVC tumor thrombus scheduled fro OR in am      Referring Physician:                                                                                                           Surgeon: Dr Miesha Ware    Procedure: removal of renal masswith  IVC involvement possible circ arrest    No Known Allergie  Intolerances  HPI:  This is a 54 year old female with a medical history significant for asthma, and active heavy smoking, presenting as a transfer from [facility] for findings of 9cm right renal mass.  Patient reports she has been having epigastric pain, a poor appetite, and nausea and vomiting for at least 3 months.  She reports she cannot tolerate anything po, vomits up anything she ingests, denies hematemesis.  She reports she has lost nearly 100lbs within this time period as well suggesting that she was over 250lbs prior and is now around 140lbs.  She reports weakness and fatigue as well.  She presented to [facility] for increased vomiting, epigastric pain and resultant weakness on 5/30.  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  Denies fevers/chills.  Denies hematuria, increase in urgency/ frequency, dysuria, denies retention.  Denies chest pain.  Reports occasional shortness of breath most often associated with vomiting.  Denies pain or nausea currently. (01 Jun 2019 21:31)      PAST MEDICAL & SURGICAL HISTORY:  [place] (hard of hearing): uses hearing aides bilaterally  Asthma  History of tubal ligation      MEDICATIONS  (STANDING):  cefuroxime  IVPB 1500 milliGRAM(s) IV Intermittent once  chlorhexidine 0.12% Liquid 30 milliLiter(s) Swish and Spit once  chlorhexidine 4% Liquid 1 Application(s) Topical once  chlorhexidine 4% Liquid 1 Application(s) Topical <User Schedule>  docusate sodium 100 milliGRAM(s) Oral three times a day  heparin  Injectable 5000 Unit(s) SubCutaneous every 8 hours  nicotine - 21 mG/24Hr(s) Patch 1 patch Transdermal daily  senna 2 Tablet(s) Oral at bedtime    MEDICATIONS  (PRN):  acetaminophen   Tablet .. 650 milliGRAM(s) Oral every 6 hours PRN Temp greater or equal to 38C (100.4F), Mild Pain (1 - 3)  ALBUTerol    90 MICROgram(s) HFA Inhaler 2 Puff(s) Inhalation every 6 hours PRN Shortness of Breath and/or Wheezing  sodium chloride 0.9% lock flush 10 milliLiter(s) IV Push every 1 hour PRN Pre/post blood products, medications, blood draw, and to maintain line patency        Labs:                        11.1   7.4   )-----------( 406      ( 06 Jun 2019 07:08 )             34.6     06-06    137  |  103  |  11  ----------------------------<  104<H>  4.4   |  24  |  0.92    Ca    12.8<H>      06 Jun 2019 07:07  Phos  2.8     06-06  Mg     2.1     06-06    TPro  6.8  /  Alb  3.7  /  TBili  0.3  /  DBili  x   /  AST  8<L>  /  ALT  11  /  AlkPhos  79  06-06    PT/INR - ( 06 Jun 2019 07:08 )   PT: 11.6 sec;   INR: 1.02 ratio         PTT - ( 06 Jun 2019 07:08 )  PTT:33.3 sec    Blood Type: ABO Interpretation: A (06-06 @ 07:44)         MRSA:  / MSSA:       CT < from: CT Chest No Cont (05.31.19 @ 20:13) >  LUNGS AND AIRWAYS: Patent central airways.  Scattered bilateral pulmonary   nodules likely representing metastatic disease. For reference:  1.  A right middle lobe nodule measures 1.3 cm (2, 64).  2.  A left lower lobe nodule measures 6 mm (2, 61).    PLEURA: No pleural effusion or pneumothorax.    MEDIASTINUM AND DERICK: No lymphadenopathy.    VESSELS: Within normal limits. Left-sided PICC with tip in the SVC.    HEART: Cardiomegaly. No pericardial effusion.    CHEST WALL AND LOWER NECK: Within normal limits.    VISUALIZED UPPER ABDOMEN: Right renal mass extending into the IVC is   partially visualized.    BONES: No evidence of osseous metastatic disease.    IMPRESSION:     Bilateral pulmonary nodules as described above likely representing   metastatic disease.    < end of copied text >      EKG:< from: 12 Lead ECG (05.31.19 @ 05:59) >  Diagnosis Line Sinus rhythm with marked sinus arrhythmia  Left axis deviation  Right bundle branch block  Abnormal ECG    < end of copied text >      Carotid Duplex:      PFT's:    Echocardiogram:< from: Transthoracic Echocardiogram (06.03.19 @ 08:31) >  CONCLUSIONS:  Normal left ventricular systolic function. No segmental  wall motion abnormalities.  A large (>10 cm. in length) mass present in the inferior  cava. It can be seen extending into the right atrium (best  seen in the RV-inflow view).    < end of copied text >      Cardiac catheterization:< from: Cardiac Cath Lab - Adult (06.05.19 @ 17:26) >  VENTRICLES: No left ventriculogram was performed.  CORONARY VESSELS: The coronary circulation is right dominant.  LM:   --  LM: Normal.  LAD:   --  LAD: Normal.  CX:   --  Circumflex: Normal.  RCA:   --  RCA: Normal.  COMPLICATIONS: There were no complications.          Vein Mapping:    Gen: WN/WD NAD [place] uses hearing aids  Neuro: AAOx3, nonfocal  Pulm: CTA B/L  CV: RRR, S1S2  Abd: Soft, NT, ND +BS  Ext: No edema, + peripheral pulses      Pt has AICD/PPM [ ] Yes  [ x] No             Brand Name:  Type [employer] Cross  [x ] Yes  [ ] No  NPO after Midnight [x ] Yes  [ ] No  Pre-op ABX ordered, to be taped on chart:  [ x] Yes  [ ] No     Hibiclens/Peridex ordered [ x] Yes  [ ] No  Intraop on Hold: PRBCs, CXR, ERICA [x ]   Consent obtained  [ ] Yes  [ ] No

## 2019-06-06 NOTE — PROGRESS NOTE ADULT - ASSESSMENT
9 cm R renal mass with lung mets and IVC tumor thrombus  - PO diet, Ensure but npo p noon for mri   - calorie count   - physical therapy    - mri to delineate abd tumor extension   - OR planning for friday   - to be consented today by all parties  -6 units on hold   -preop labs   - DVT prophylaxis

## 2019-06-07 ENCOUNTER — RESULT REVIEW (OUTPATIENT)
Age: 54
End: 2019-06-07

## 2019-06-07 ENCOUNTER — APPOINTMENT (OUTPATIENT)
Dept: CARDIOTHORACIC SURGERY | Facility: HOSPITAL | Age: 54
End: 2019-06-07

## 2019-06-07 ENCOUNTER — APPOINTMENT (OUTPATIENT)
Dept: UROLOGY | Facility: HOSPITAL | Age: 54
End: 2019-06-07

## 2019-06-07 LAB
ALBUMIN SERPL ELPH-MCNC: 3.8 G/DL — SIGNIFICANT CHANGE UP (ref 3.3–5)
ALP SERPL-CCNC: 42 U/L — SIGNIFICANT CHANGE UP (ref 40–120)
ALT FLD-CCNC: 56 U/L — HIGH (ref 10–45)
ANION GAP SERPL CALC-SCNC: 15 MMOL/L — SIGNIFICANT CHANGE UP (ref 5–17)
APTT BLD: 32 SEC — SIGNIFICANT CHANGE UP (ref 27.5–36.3)
AST SERPL-CCNC: 103 U/L — HIGH (ref 10–40)
BASE EXCESS BLDV CALC-SCNC: -0.2 MMOL/L — SIGNIFICANT CHANGE UP (ref -2–2)
BASE EXCESS BLDV CALC-SCNC: -2.8 MMOL/L — LOW (ref -2–2)
BASE EXCESS BLDV CALC-SCNC: -4.3 MMOL/L — LOW (ref -2–2)
BASE EXCESS BLDV CALC-SCNC: -4.3 MMOL/L — LOW (ref -2–2)
BASE EXCESS BLDV CALC-SCNC: 1.6 MMOL/L — SIGNIFICANT CHANGE UP (ref -2–2)
BASE EXCESS BLDV CALC-SCNC: 3.3 MMOL/L — HIGH (ref -2–2)
BILIRUB SERPL-MCNC: 2.1 MG/DL — HIGH (ref 0.2–1.2)
BLOOD GAS VENOUS - CREATININE: SIGNIFICANT CHANGE UP MG/DL (ref 0.5–1.3)
BUN SERPL-MCNC: 10 MG/DL — SIGNIFICANT CHANGE UP (ref 7–23)
CA-I SERPL-SCNC: 1.04 MMOL/L — LOW (ref 1.12–1.3)
CA-I SERPL-SCNC: 1.09 MMOL/L — LOW (ref 1.12–1.3)
CA-I SERPL-SCNC: 1.13 MMOL/L — SIGNIFICANT CHANGE UP (ref 1.12–1.3)
CA-I SERPL-SCNC: 1.14 MMOL/L — SIGNIFICANT CHANGE UP (ref 1.12–1.3)
CA-I SERPL-SCNC: 1.16 MMOL/L — SIGNIFICANT CHANGE UP (ref 1.12–1.3)
CA-I SERPL-SCNC: 1.19 MMOL/L — SIGNIFICANT CHANGE UP (ref 1.12–1.3)
CA-I SERPL-SCNC: 1.25 MMOL/L — SIGNIFICANT CHANGE UP (ref 1.12–1.3)
CALCIUM SERPL-MCNC: 10.7 MG/DL — HIGH (ref 8.4–10.5)
CHLORIDE BLDV-SCNC: SIGNIFICANT CHANGE UP MMOL/L (ref 96–108)
CHLORIDE SERPL-SCNC: 101 MMOL/L — SIGNIFICANT CHANGE UP (ref 96–108)
CK MB BLD-MCNC: 8.7 % — HIGH (ref 0–3.5)
CK MB CFR SERPL CALC: 42.5 NG/ML — HIGH (ref 0–3.8)
CK SERPL-CCNC: 491 U/L — HIGH (ref 25–170)
CO2 SERPL-SCNC: 23 MMOL/L — SIGNIFICANT CHANGE UP (ref 22–31)
CREAT SERPL-MCNC: 0.87 MG/DL — SIGNIFICANT CHANGE UP (ref 0.5–1.3)
GAS PNL BLDA: SIGNIFICANT CHANGE UP
GAS PNL BLDV: 135 MMOL/L — LOW (ref 136–145)
GAS PNL BLDV: 136 MMOL/L — SIGNIFICANT CHANGE UP (ref 136–145)
GAS PNL BLDV: 137 MMOL/L — SIGNIFICANT CHANGE UP (ref 136–145)
GAS PNL BLDV: 139 MMOL/L — SIGNIFICANT CHANGE UP (ref 136–145)
GAS PNL BLDV: 140 MMOL/L — SIGNIFICANT CHANGE UP (ref 136–145)
GAS PNL BLDV: 141 MMOL/L — SIGNIFICANT CHANGE UP (ref 136–145)
GAS PNL BLDV: 141 MMOL/L — SIGNIFICANT CHANGE UP (ref 136–145)
GAS PNL BLDV: SIGNIFICANT CHANGE UP
GLUCOSE BLDC GLUCOMTR-MCNC: 108 MG/DL — HIGH (ref 70–99)
GLUCOSE BLDC GLUCOMTR-MCNC: 109 MG/DL — HIGH (ref 70–99)
GLUCOSE BLDV-MCNC: 174 MG/DL — HIGH (ref 70–99)
GLUCOSE BLDV-MCNC: 236 MG/DL — HIGH (ref 70–99)
GLUCOSE BLDV-MCNC: 249 MG/DL — HIGH (ref 70–99)
GLUCOSE BLDV-MCNC: 249 MG/DL — HIGH (ref 70–99)
GLUCOSE BLDV-MCNC: 275 MG/DL — HIGH (ref 70–99)
GLUCOSE BLDV-MCNC: 281 MG/DL — HIGH (ref 70–99)
GLUCOSE BLDV-MCNC: 308 MG/DL — HIGH (ref 70–99)
GLUCOSE SERPL-MCNC: 202 MG/DL — HIGH (ref 70–99)
HCO3 BLDV-SCNC: 21 MMOL/L — SIGNIFICANT CHANGE UP (ref 21–29)
HCO3 BLDV-SCNC: 22 MMOL/L — SIGNIFICANT CHANGE UP (ref 21–29)
HCO3 BLDV-SCNC: 23 MMOL/L — SIGNIFICANT CHANGE UP (ref 21–29)
HCO3 BLDV-SCNC: 26 MMOL/L — SIGNIFICANT CHANGE UP (ref 21–29)
HCO3 BLDV-SCNC: 28 MMOL/L — SIGNIFICANT CHANGE UP (ref 21–29)
HCT VFR BLD CALC: 28.1 % — LOW (ref 34.5–45)
HCT VFR BLDA CALC: 19 % — CRITICAL LOW (ref 39–50)
HCT VFR BLDA CALC: 23 % — LOW (ref 39–50)
HCT VFR BLDA CALC: 24 % — LOW (ref 39–50)
HCT VFR BLDA CALC: 27 % — LOW (ref 39–50)
HCT VFR BLDA CALC: 29 % — LOW (ref 39–50)
HGB BLD CALC-MCNC: 6.1 G/DL — CRITICAL LOW (ref 11.5–15.5)
HGB BLD CALC-MCNC: 7.4 G/DL — LOW (ref 11.5–15.5)
HGB BLD CALC-MCNC: 7.6 G/DL — LOW (ref 11.5–15.5)
HGB BLD CALC-MCNC: 7.7 G/DL — LOW (ref 11.5–15.5)
HGB BLD CALC-MCNC: 7.8 G/DL — LOW (ref 11.5–15.5)
HGB BLD CALC-MCNC: 8.6 G/DL — LOW (ref 11.5–15.5)
HGB BLD CALC-MCNC: 9.3 G/DL — LOW (ref 11.5–15.5)
HGB BLD-MCNC: 9.6 G/DL — LOW (ref 11.5–15.5)
HOROWITZ INDEX BLDV+IHG-RTO: 0 — SIGNIFICANT CHANGE UP
INR BLD: 1.2 RATIO — HIGH (ref 0.88–1.16)
LACTATE BLDV-MCNC: 1.1 MMOL/L — SIGNIFICANT CHANGE UP (ref 0.7–2)
LACTATE BLDV-MCNC: 1.5 MMOL/L — SIGNIFICANT CHANGE UP (ref 0.7–2)
LACTATE BLDV-MCNC: 2.9 MMOL/L — HIGH (ref 0.7–2)
LACTATE BLDV-MCNC: 3.3 MMOL/L — HIGH (ref 0.7–2)
LACTATE BLDV-MCNC: 4.2 MMOL/L — CRITICAL HIGH (ref 0.7–2)
LACTATE BLDV-MCNC: 4.3 MMOL/L — CRITICAL HIGH (ref 0.7–2)
LACTATE BLDV-MCNC: 5 MMOL/L — CRITICAL HIGH (ref 0.7–2)
LYMPHOCYTES # BLD AUTO: 0.6 K/UL — LOW (ref 1–3.3)
LYMPHOCYTES # BLD AUTO: 6 % — LOW (ref 13–44)
MAGNESIUM SERPL-MCNC: 1.7 MG/DL — SIGNIFICANT CHANGE UP (ref 1.6–2.6)
MCHC RBC-ENTMCNC: 31.2 PG — SIGNIFICANT CHANGE UP (ref 27–34)
MCHC RBC-ENTMCNC: 34 GM/DL — SIGNIFICANT CHANGE UP (ref 32–36)
MCV RBC AUTO: 91.6 FL — SIGNIFICANT CHANGE UP (ref 80–100)
MONOCYTES # BLD AUTO: 0.8 K/UL — SIGNIFICANT CHANGE UP (ref 0–0.9)
MONOCYTES NFR BLD AUTO: 11 % — SIGNIFICANT CHANGE UP (ref 2–14)
NEUTROPHILS # BLD AUTO: 9.9 K/UL — HIGH (ref 1.8–7.4)
NEUTROPHILS NFR BLD AUTO: 82 % — HIGH (ref 43–77)
NEUTS BAND # BLD: 1 % — SIGNIFICANT CHANGE UP (ref 0–8)
PCO2 BLDV: 34 MMHG — LOW (ref 35–50)
PCO2 BLDV: 46 MMHG — SIGNIFICANT CHANGE UP (ref 35–50)
PCO2 BLDV: 49 MMHG — SIGNIFICANT CHANGE UP (ref 35–50)
PCO2 BLDV: 52 MMHG — HIGH (ref 35–50)
PCO2 BLDV: 54 MMHG — HIGH (ref 35–50)
PCO2 BLDV: 57 MMHG — HIGH (ref 35–50)
PCO2 BLDV: 62 MMHG — HIGH (ref 35–50)
PH BLDV: 7.25 — LOW (ref 7.35–7.45)
PH BLDV: 7.28 — LOW (ref 7.35–7.45)
PH BLDV: 7.28 — LOW (ref 7.35–7.45)
PH BLDV: 7.29 — LOW (ref 7.35–7.45)
PH BLDV: 7.29 — LOW (ref 7.35–7.45)
PH BLDV: 7.3 — LOW (ref 7.35–7.45)
PH BLDV: 7.5 — HIGH (ref 7.35–7.45)
PHOSPHATE SERPL-MCNC: 1.9 MG/DL — LOW (ref 2.5–4.5)
PLAT MORPH BLD: ABNORMAL
PLATELET # BLD AUTO: ABNORMAL (ref 150–400)
PO2 BLDV: 131 MMHG — HIGH (ref 25–45)
PO2 BLDV: 244 MMHG — HIGH (ref 25–45)
PO2 BLDV: 44 MMHG — SIGNIFICANT CHANGE UP (ref 25–45)
PO2 BLDV: 45 MMHG — SIGNIFICANT CHANGE UP (ref 25–45)
PO2 BLDV: 48 MMHG — HIGH (ref 25–45)
PO2 BLDV: 583 MMHG — HIGH (ref 25–45)
PO2 BLDV: 65 MMHG — HIGH (ref 25–45)
POTASSIUM BLDV-SCNC: 3.7 MMOL/L — SIGNIFICANT CHANGE UP (ref 3.5–5)
POTASSIUM BLDV-SCNC: 3.7 MMOL/L — SIGNIFICANT CHANGE UP (ref 3.5–5)
POTASSIUM BLDV-SCNC: 3.8 MMOL/L — SIGNIFICANT CHANGE UP (ref 3.5–5)
POTASSIUM BLDV-SCNC: 3.8 MMOL/L — SIGNIFICANT CHANGE UP (ref 3.5–5)
POTASSIUM BLDV-SCNC: 4.2 MMOL/L — SIGNIFICANT CHANGE UP (ref 3.5–5)
POTASSIUM BLDV-SCNC: 4.4 MMOL/L — SIGNIFICANT CHANGE UP (ref 3.5–5)
POTASSIUM BLDV-SCNC: 5.1 MMOL/L — HIGH (ref 3.5–5)
POTASSIUM SERPL-MCNC: 4.9 MMOL/L — SIGNIFICANT CHANGE UP (ref 3.5–5.3)
POTASSIUM SERPL-SCNC: 4.9 MMOL/L — SIGNIFICANT CHANGE UP (ref 3.5–5.3)
PROT SERPL-MCNC: 5.3 G/DL — LOW (ref 6–8.3)
PROTHROM AB SERPL-ACNC: 13.7 SEC — HIGH (ref 10–12.9)
RBC # BLD: 3.06 M/UL — LOW (ref 3.8–5.2)
RBC # FLD: 14.5 % — SIGNIFICANT CHANGE UP (ref 10.3–14.5)
RBC BLD AUTO: SIGNIFICANT CHANGE UP
SAO2 % BLDV: 100 % — HIGH (ref 67–88)
SAO2 % BLDV: 100 % — HIGH (ref 67–88)
SAO2 % BLDV: 76 % — SIGNIFICANT CHANGE UP (ref 67–88)
SAO2 % BLDV: 78 % — SIGNIFICANT CHANGE UP (ref 67–88)
SAO2 % BLDV: 81 % — SIGNIFICANT CHANGE UP (ref 67–88)
SAO2 % BLDV: 96 % — HIGH (ref 67–88)
SAO2 % BLDV: 98 % — HIGH (ref 67–88)
SODIUM SERPL-SCNC: 139 MMOL/L — SIGNIFICANT CHANGE UP (ref 135–145)
TROPONIN T, HIGH SENSITIVITY RESULT: 931 NG/L — HIGH (ref 0–51)
WBC # BLD: 11.4 K/UL — HIGH (ref 3.8–10.5)
WBC # FLD AUTO: 11.4 K/UL — HIGH (ref 3.8–10.5)

## 2019-06-07 PROCEDURE — 71045 X-RAY EXAM CHEST 1 VIEW: CPT | Mod: 26

## 2019-06-07 PROCEDURE — 88307 TISSUE EXAM BY PATHOLOGIST: CPT | Mod: 26

## 2019-06-07 PROCEDURE — 99291 CRITICAL CARE FIRST HOUR: CPT

## 2019-06-07 PROCEDURE — 39010 EXPLORATION OF CHEST: CPT

## 2019-06-07 PROCEDURE — 74018 RADEX ABDOMEN 1 VIEW: CPT | Mod: 26

## 2019-06-07 PROCEDURE — 50230 REMOVAL KIDNEY OPEN RADICAL: CPT | Mod: RT

## 2019-06-07 PROCEDURE — 39010 EXPLORATION OF CHEST: CPT | Mod: AS

## 2019-06-07 PROCEDURE — 88312 SPECIAL STAINS GROUP 1: CPT | Mod: 26

## 2019-06-07 PROCEDURE — 50230 REMOVAL KIDNEY OPEN RADICAL: CPT

## 2019-06-07 RX ORDER — METOCLOPRAMIDE HCL 10 MG
10 TABLET ORAL EVERY 8 HOURS
Refills: 0 | Status: COMPLETED | OUTPATIENT
Start: 2019-06-07 | End: 2019-06-09

## 2019-06-07 RX ORDER — HYDROMORPHONE HYDROCHLORIDE 2 MG/ML
1 INJECTION INTRAMUSCULAR; INTRAVENOUS; SUBCUTANEOUS ONCE
Refills: 0 | Status: DISCONTINUED | OUTPATIENT
Start: 2019-06-07 | End: 2019-06-07

## 2019-06-07 RX ORDER — NALOXONE HYDROCHLORIDE 4 MG/.1ML
0.1 SPRAY NASAL
Refills: 0 | Status: DISCONTINUED | OUTPATIENT
Start: 2019-06-07 | End: 2019-06-09

## 2019-06-07 RX ORDER — METOCLOPRAMIDE HCL 10 MG
10 TABLET ORAL EVERY 8 HOURS
Refills: 0 | Status: DISCONTINUED | OUTPATIENT
Start: 2019-06-07 | End: 2019-06-07

## 2019-06-07 RX ORDER — DOCUSATE SODIUM 100 MG
100 CAPSULE ORAL THREE TIMES A DAY
Refills: 0 | Status: DISCONTINUED | OUTPATIENT
Start: 2019-06-07 | End: 2019-06-12

## 2019-06-07 RX ORDER — SODIUM CHLORIDE 9 MG/ML
1000 INJECTION INTRAMUSCULAR; INTRAVENOUS; SUBCUTANEOUS
Refills: 0 | Status: DISCONTINUED | OUTPATIENT
Start: 2019-06-07 | End: 2019-06-10

## 2019-06-07 RX ORDER — NOREPINEPHRINE BITARTRATE/D5W 8 MG/250ML
0.05 PLASTIC BAG, INJECTION (ML) INTRAVENOUS
Qty: 8 | Refills: 0 | Status: DISCONTINUED | OUTPATIENT
Start: 2019-06-07 | End: 2019-06-08

## 2019-06-07 RX ORDER — MEPERIDINE HYDROCHLORIDE 50 MG/ML
25 INJECTION INTRAMUSCULAR; INTRAVENOUS; SUBCUTANEOUS ONCE
Refills: 0 | Status: DISCONTINUED | OUTPATIENT
Start: 2019-06-07 | End: 2019-06-08

## 2019-06-07 RX ORDER — ONDANSETRON 8 MG/1
4 TABLET, FILM COATED ORAL ONCE
Refills: 0 | Status: DISCONTINUED | OUTPATIENT
Start: 2019-06-07 | End: 2019-06-07

## 2019-06-07 RX ORDER — ONDANSETRON 8 MG/1
4 TABLET, FILM COATED ORAL EVERY 6 HOURS
Refills: 0 | Status: DISCONTINUED | OUTPATIENT
Start: 2019-06-07 | End: 2019-06-09

## 2019-06-07 RX ORDER — ENOXAPARIN SODIUM 100 MG/ML
40 INJECTION SUBCUTANEOUS DAILY
Refills: 0 | Status: DISCONTINUED | OUTPATIENT
Start: 2019-06-08 | End: 2019-06-08

## 2019-06-07 RX ORDER — CEFUROXIME AXETIL 250 MG
1500 TABLET ORAL EVERY 8 HOURS
Refills: 0 | Status: COMPLETED | OUTPATIENT
Start: 2019-06-08 | End: 2019-06-08

## 2019-06-07 RX ORDER — INSULIN HUMAN 100 [IU]/ML
8 INJECTION, SOLUTION SUBCUTANEOUS
Qty: 100 | Refills: 0 | Status: DISCONTINUED | OUTPATIENT
Start: 2019-06-07 | End: 2019-06-08

## 2019-06-07 RX ORDER — DEXMEDETOMIDINE HYDROCHLORIDE IN 0.9% SODIUM CHLORIDE 4 UG/ML
0.2 INJECTION INTRAVENOUS
Qty: 200 | Refills: 0 | Status: DISCONTINUED | OUTPATIENT
Start: 2019-06-07 | End: 2019-06-08

## 2019-06-07 RX ORDER — ONDANSETRON 8 MG/1
4 TABLET, FILM COATED ORAL ONCE
Refills: 0 | Status: COMPLETED | OUTPATIENT
Start: 2019-06-07 | End: 2019-06-08

## 2019-06-07 RX ORDER — SODIUM CHLORIDE 9 MG/ML
1000 INJECTION, SOLUTION INTRAVENOUS
Refills: 0 | Status: DISCONTINUED | OUTPATIENT
Start: 2019-06-07 | End: 2019-06-07

## 2019-06-07 RX ORDER — SODIUM CHLORIDE 9 MG/ML
500 INJECTION, SOLUTION INTRAVENOUS ONCE
Refills: 0 | Status: COMPLETED | OUTPATIENT
Start: 2019-06-07 | End: 2019-06-07

## 2019-06-07 RX ORDER — PROPOFOL 10 MG/ML
13.91 INJECTION, EMULSION INTRAVENOUS
Qty: 500 | Refills: 0 | Status: DISCONTINUED | OUTPATIENT
Start: 2019-06-07 | End: 2019-06-08

## 2019-06-07 RX ORDER — PANTOPRAZOLE SODIUM 20 MG/1
40 TABLET, DELAYED RELEASE ORAL DAILY
Refills: 0 | Status: DISCONTINUED | OUTPATIENT
Start: 2019-06-07 | End: 2019-06-08

## 2019-06-07 RX ORDER — CHLORHEXIDINE GLUCONATE 213 G/1000ML
5 SOLUTION TOPICAL EVERY 4 HOURS
Refills: 0 | Status: DISCONTINUED | OUTPATIENT
Start: 2019-06-07 | End: 2019-06-08

## 2019-06-07 RX ORDER — CHLORHEXIDINE GLUCONATE 213 G/1000ML
1 SOLUTION TOPICAL
Refills: 0 | Status: DISCONTINUED | OUTPATIENT
Start: 2019-06-07 | End: 2019-06-10

## 2019-06-07 RX ORDER — MAGNESIUM SULFATE 500 MG/ML
2 VIAL (ML) INJECTION ONCE
Refills: 0 | Status: COMPLETED | OUTPATIENT
Start: 2019-06-07 | End: 2019-06-07

## 2019-06-07 RX ORDER — ASPIRIN/CALCIUM CARB/MAGNESIUM 324 MG
81 TABLET ORAL DAILY
Refills: 0 | Status: DISCONTINUED | OUTPATIENT
Start: 2019-06-08 | End: 2019-06-12

## 2019-06-07 RX ORDER — VASOPRESSIN 20 [USP'U]/ML
0.03 INJECTION INTRAVENOUS
Qty: 50 | Refills: 0 | Status: DISCONTINUED | OUTPATIENT
Start: 2019-06-07 | End: 2019-06-09

## 2019-06-07 RX ADMIN — HYDROMORPHONE HYDROCHLORIDE 1 MILLIGRAM(S): 2 INJECTION INTRAMUSCULAR; INTRAVENOUS; SUBCUTANEOUS at 18:40

## 2019-06-07 RX ADMIN — CHLORHEXIDINE GLUCONATE 5 MILLILITER(S): 213 SOLUTION TOPICAL at 22:00

## 2019-06-07 RX ADMIN — Medication 10 MILLIGRAM(S): at 22:00

## 2019-06-07 RX ADMIN — SODIUM CHLORIDE 1000 MILLILITER(S): 9 INJECTION, SOLUTION INTRAVENOUS at 20:48

## 2019-06-07 RX ADMIN — Medication 50 GRAM(S): at 20:00

## 2019-06-07 RX ADMIN — SODIUM CHLORIDE 1000 MILLILITER(S): 9 INJECTION, SOLUTION INTRAVENOUS at 21:54

## 2019-06-07 RX ADMIN — Medication 5.62 MICROGRAM(S)/KG/MIN: at 20:02

## 2019-06-07 RX ADMIN — HYDROMORPHONE HYDROCHLORIDE 1 MILLIGRAM(S): 2 INJECTION INTRAMUSCULAR; INTRAVENOUS; SUBCUTANEOUS at 18:55

## 2019-06-07 RX ADMIN — DEXMEDETOMIDINE HYDROCHLORIDE IN 0.9% SODIUM CHLORIDE 3 MICROGRAM(S)/KG/HR: 4 INJECTION INTRAVENOUS at 20:01

## 2019-06-07 RX ADMIN — PROPOFOL 5 MICROGRAM(S)/KG/MIN: 10 INJECTION, EMULSION INTRAVENOUS at 20:01

## 2019-06-07 RX ADMIN — CHLORHEXIDINE GLUCONATE 30 MILLILITER(S): 213 SOLUTION TOPICAL at 06:17

## 2019-06-07 RX ADMIN — CHLORHEXIDINE GLUCONATE 1 APPLICATION(S): 213 SOLUTION TOPICAL at 06:17

## 2019-06-07 RX ADMIN — VASOPRESSIN 2 UNIT(S)/MIN: 20 INJECTION INTRAVENOUS at 20:02

## 2019-06-07 RX ADMIN — HEPARIN SODIUM 5000 UNIT(S): 5000 INJECTION INTRAVENOUS; SUBCUTANEOUS at 00:03

## 2019-06-07 NOTE — PROGRESS NOTE ADULT - SUBJECTIVE AND OBJECTIVE BOX
Under  GA under  Hypothermic  arrest  IVC  tumour  thrombectomy  Repair  IVC  done  with Primary  Venorraphy  done  Hemostasis  secured   Dr Ware   did  the Nephrectomy  the tumour  thrombus  was removed  along  with  the  kidney in  one  specimen   When I left the  OR  the hemostasis  is  adequate  and Dr Whiteside  is  closing  the  sternotomy and  Dr Ware is  closing  the  abdomen

## 2019-06-07 NOTE — PROGRESS NOTE ADULT - SUBJECTIVE AND OBJECTIVE BOX
Subjective: Patient seen and examined. No new events except as noted.     SUBJECTIVE/ROS:        MEDICATIONS:  MEDICATIONS  (STANDING):  cefuroxime  IVPB 1500 milliGRAM(s) IV Intermittent once  chlorhexidine 4% Liquid 1 Application(s) Topical <User Schedule>  docusate sodium 100 milliGRAM(s) Oral three times a day  heparin  Injectable 5000 Unit(s) SubCutaneous every 8 hours  lactated ringers. 1000 milliLiter(s) (125 mL/Hr) IV Continuous <Continuous>  nicotine - 21 mG/24Hr(s) Patch 1 patch Transdermal daily  senna 2 Tablet(s) Oral at bedtime      PHYSICAL EXAM:  T(C): 36.9 (06-07-19 @ 05:42), Max: 37 (06-06-19 @ 18:18)  HR: 76 (06-07-19 @ 05:42) (67 - 84)  BP: 119/79 (06-07-19 @ 05:42) (104/63 - 119/79)  RR: 18 (06-07-19 @ 05:42) (18 - 18)  SpO2: 99% (06-07-19 @ 05:42) (97% - 99%)  Wt(kg): --  I&O's Summary    05 Jun 2019 07:01  -  06 Jun 2019 07:00  --------------------------------------------------------  IN: 950 mL / OUT: 1400 mL / NET: -450 mL    06 Jun 2019 07:01  -  07 Jun 2019 06:21  --------------------------------------------------------  IN: 600 mL / OUT: 400 mL / NET: 200 mL            JVP: Normal  Neck: supple  Lung: clear   CV: S1 S2 , Murmur:  Abd: soft  Ext: No edema  neuro: Awake / alert  Psych: flat affect  Skin: normal``    LABS/DATA:    CARDIAC MARKERS:                                11.1   7.4   )-----------( 406      ( 06 Jun 2019 07:08 )             34.6     06-06    137  |  103  |  11  ----------------------------<  104<H>  4.4   |  24  |  0.92    Ca    12.8<H>      06 Jun 2019 07:07  Phos  2.8     06-06  Mg     2.1     06-06    TPro  6.8  /  Alb  3.7  /  TBili  0.3  /  DBili  x   /  AST  8<L>  /  ALT  11  /  AlkPhos  79  06-06    proBNP:   Lipid Profile:   HgA1c:   TSH:     TELE:  EKG:

## 2019-06-07 NOTE — PROGRESS NOTE ADULT - SUBJECTIVE AND OBJECTIVE BOX
Surgery Progress Note    SUBJECTIVE: Pt seen and examined at bedside. Patient comfortable and in no-apparent distress.        Vital Signs Last 24 Hrs  T(C): 36.5 (07 Jun 2019 06:45), Max: 37 (06 Jun 2019 18:18)  T(F): 97.7 (07 Jun 2019 06:21), Max: 98.6 (06 Jun 2019 18:18)  HR: 99 (07 Jun 2019 06:45) (71 - 99)  BP: 115/86 (07 Jun 2019 06:45) (113/73 - 119/79)  BP(mean): --  RR: 16 (07 Jun 2019 06:45) (16 - 18)  SpO2: 100% (07 Jun 2019 06:45) (97% - 100%)    Physical Exam:  General Appearance: Appears well, NAD  Respiratory: No labored breathing  CV: Pulse regularly present  Abdomen: Soft, nontense, NTND      LABS:                        11.1   7.4   )-----------( 406      ( 06 Jun 2019 07:08 )             34.6     06-06    137  |  103  |  11  ----------------------------<  104<H>  4.4   |  24  |  0.92    Ca    12.8<H>      06 Jun 2019 07:07  Phos  2.8     06-06  Mg     2.1     06-06    TPro  6.8  /  Alb  3.7  /  TBili  0.3  /  DBili  x   /  AST  8<L>  /  ALT  11  /  AlkPhos  79  06-06    PT/INR - ( 06 Jun 2019 07:08 )   PT: 11.6 sec;   INR: 1.02 ratio         PTT - ( 06 Jun 2019 07:08 )  PTT:33.3 sec      INs and OUTs:    06-06-19 @ 07:01  -  06-07-19 @ 07:00  --------------------------------------------------------  IN: 600 mL / OUT: 400 mL / NET: 200 mL

## 2019-06-07 NOTE — PROGRESS NOTE ADULT - ASSESSMENT
renal mass  IVC tumor thrombus extending to right atrium     Normal LV / RV    cath normal coronary arteries   CV stable for OR as planned

## 2019-06-07 NOTE — PROGRESS NOTE ADULT - ASSESSMENT
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    - OR today  - Will follow    Vascular surgery   [id number]

## 2019-06-07 NOTE — BRIEF OPERATIVE NOTE - OPERATION/FINDINGS
h/o right renal cell ca with tumor extension into IVC to the level of hepatic veins. s/p radical right nephrectomy with ivc tumor thrombectomy and repair of IVC under circulatory arrest. Please see CT surgery and Urology notes for further details
pre/post-op: R renal mass with vena caval thrombus  procedure: R radical nephrectomy with IVC tumor thrombectomy, primary closure of IVC, cardiopulmonary bypass and arrest

## 2019-06-07 NOTE — PROGRESS NOTE ADULT - SUBJECTIVE AND OBJECTIVE BOX
LIZ CASEY  MRN#:  [id number]    The patient is a 54y Female with PMH significant for asthma, and active heavy smoking, who presented recently with 3 month history of epigastric pain, nausea and vomiting and a 100 lb weight loss, found to have renal cell CA, now s/p right nephrectomy and IVC thrombectomy today, requiring sternotomy for circulatory arrest, who was seen, evaluated, & examined with the CTICU staff on rounds and later in the evening with a multidisciplinary care plan formulated & implemented.  All available clinical, laboratory, radiographic, pharmacologic, and electrocardiographic data were reviewed & analyzed.      The patient was in the CTICU in critical condition secondary to persistent cardiopulmonary dysfunction, hemodynamically significant anemia, vasogenic shock, persistent cardiovascular dysfunction, acidosis, & hyperglycemia.      Respiratory status required full ventilatory support, the following of ABG’s with A-line monitoring, continuous pulse oximetry monitoring, & an IV Propofol infusion for support & to evaluate for & prevent further decompensation secondary to persistent cardiopulmonary dysfunction.     Invasive hemodynamic monitoring with a central venous catheter & an A-line were required for the continuous central venous and MAP/BP monitoring to ensure adequate cardiovascular support and to evaluate for & help prevent decompensation while receiving intermittent volume expansion, external epicardial pacing, blood transfusions, blood product transfusions, an IV Levophed drip, an IV Vasopressin drip, an IV Epinephrine drip, an IV Dobutamine drip, an IV Primacor drip, an IV Amiodarone drip, & an IV Neosynephrine drip secondary to hemodynamically significant anemia/anemia due to acute blood loss, hypovolemia-shock, cardiovascular dysfunction, and hemodynamically significant bradycardia.       Metabolic stability, uncontrolled type 2 diabetes-hyperglycemia, & infection prophylaxis required an IV regular Insulin drip & the following of serial glucose levels to help achieve & maintain euglycemia.      Patient required acute postoperative critical care management and I provided 35 minutes of non-continuous care to the patient.  Discussed at length with the CTICU staff and helped coordinate care. LIZ CASEY  MRN#:  [id number]    The patient is a 54y Female with PMH significant for asthma, and active heavy smoking, who presented recently with 3 month history of epigastric pain, nausea and vomiting and a 100 lb weight loss, found to have renal cell CA, now s/p right nephrectomy and IVC thrombectomy today, requiring sternotomy for circulatory arrest, who was seen, evaluated, & examined with the CTICU staff on rounds and later in the evening with a multidisciplinary care plan formulated & implemented.  All available clinical, laboratory, radiographic, pharmacologic, and electrocardiographic data were reviewed & analyzed.      The patient was in the CTICU in critical condition secondary to persistent cardiopulmonary dysfunction, hemodynamically significant anemia, vasogenic shock, persistent cardiovascular dysfunction, acidosis, & hyperglycemia.      Respiratory status required full ventilatory support, the following of ABG’s with A-line monitoring, continuous pulse oximetry monitoring, & an IV Propofol infusion for support & to evaluate for & prevent further decompensation secondary to persistent cardiopulmonary dysfunction.     Invasive hemodynamic monitoring with a central venous catheter & an A-line were required for the continuous central venous and MAP/BP monitoring to ensure adequate cardiovascular support and to evaluate for & help prevent decompensation while receiving intermittent volume expansion, an IV Levophed drip, and an IV Vasopressin drip secondary to hemodynamically significant anemia/anemia due to acute blood loss, hypovolemia-shock, and vasogenic shock.    Metabolic stability, stress hyperglycemia, & infection prophylaxis required an IV regular Insulin drip & the following of serial glucose levels to help achieve & maintain euglycemia.      Patient required acute postoperative critical care management and I provided 35 minutes of non-continuous care to the patient.  Discussed at length with the CTICU staff and helped coordinate care.

## 2019-06-08 LAB
ALBUMIN SERPL ELPH-MCNC: 3.5 G/DL — SIGNIFICANT CHANGE UP (ref 3.3–5)
ALP SERPL-CCNC: 41 U/L — SIGNIFICANT CHANGE UP (ref 40–120)
ALT FLD-CCNC: 54 U/L — HIGH (ref 10–45)
ANION GAP SERPL CALC-SCNC: 13 MMOL/L — SIGNIFICANT CHANGE UP (ref 5–17)
AST SERPL-CCNC: 127 U/L — HIGH (ref 10–40)
BASOPHILS # BLD AUTO: 0 K/UL — SIGNIFICANT CHANGE UP (ref 0–0.2)
BASOPHILS NFR BLD AUTO: 0 % — SIGNIFICANT CHANGE UP (ref 0–2)
BILIRUB SERPL-MCNC: 1.5 MG/DL — HIGH (ref 0.2–1.2)
BUN SERPL-MCNC: 13 MG/DL — SIGNIFICANT CHANGE UP (ref 7–23)
CALCIUM SERPL-MCNC: 9.8 MG/DL — SIGNIFICANT CHANGE UP (ref 8.4–10.5)
CHLORIDE SERPL-SCNC: 101 MMOL/L — SIGNIFICANT CHANGE UP (ref 96–108)
CO2 SERPL-SCNC: 25 MMOL/L — SIGNIFICANT CHANGE UP (ref 22–31)
CREAT SERPL-MCNC: 1.07 MG/DL — SIGNIFICANT CHANGE UP (ref 0.5–1.3)
EOSINOPHIL # BLD AUTO: 0 K/UL — SIGNIFICANT CHANGE UP (ref 0–0.5)
EOSINOPHIL NFR BLD AUTO: 0.1 % — SIGNIFICANT CHANGE UP (ref 0–6)
GAS PNL BLDA: SIGNIFICANT CHANGE UP
GLUCOSE BLDC GLUCOMTR-MCNC: 105 MG/DL — HIGH (ref 70–99)
GLUCOSE BLDC GLUCOMTR-MCNC: 123 MG/DL — HIGH (ref 70–99)
GLUCOSE BLDC GLUCOMTR-MCNC: 130 MG/DL — HIGH (ref 70–99)
GLUCOSE BLDC GLUCOMTR-MCNC: 134 MG/DL — HIGH (ref 70–99)
GLUCOSE BLDC GLUCOMTR-MCNC: 136 MG/DL — HIGH (ref 70–99)
GLUCOSE BLDC GLUCOMTR-MCNC: 178 MG/DL — HIGH (ref 70–99)
GLUCOSE BLDC GLUCOMTR-MCNC: 91 MG/DL — SIGNIFICANT CHANGE UP (ref 70–99)
GLUCOSE SERPL-MCNC: 158 MG/DL — HIGH (ref 70–99)
HBA1C BLD-MCNC: 5.4 % — SIGNIFICANT CHANGE UP (ref 4–5.6)
HCT VFR BLD CALC: 26.4 % — LOW (ref 34.5–45)
HGB BLD-MCNC: 9.4 G/DL — LOW (ref 11.5–15.5)
LYMPHOCYTES # BLD AUTO: 0.4 K/UL — LOW (ref 1–3.3)
LYMPHOCYTES # BLD AUTO: 2.6 % — LOW (ref 13–44)
MCHC RBC-ENTMCNC: 32.7 PG — SIGNIFICANT CHANGE UP (ref 27–34)
MCHC RBC-ENTMCNC: 35.5 GM/DL — SIGNIFICANT CHANGE UP (ref 32–36)
MCV RBC AUTO: 91.9 FL — SIGNIFICANT CHANGE UP (ref 80–100)
MONOCYTES # BLD AUTO: 0.8 K/UL — SIGNIFICANT CHANGE UP (ref 0–0.9)
MONOCYTES NFR BLD AUTO: 5.3 % — SIGNIFICANT CHANGE UP (ref 2–14)
NEUTROPHILS # BLD AUTO: 14.4 K/UL — HIGH (ref 1.8–7.4)
NEUTROPHILS NFR BLD AUTO: 91.9 % — HIGH (ref 43–77)
PLATELET # BLD AUTO: 157 K/UL — SIGNIFICANT CHANGE UP (ref 150–400)
POTASSIUM SERPL-MCNC: 4.9 MMOL/L — SIGNIFICANT CHANGE UP (ref 3.5–5.3)
POTASSIUM SERPL-SCNC: 4.9 MMOL/L — SIGNIFICANT CHANGE UP (ref 3.5–5.3)
PROT SERPL-MCNC: 5.2 G/DL — LOW (ref 6–8.3)
RBC # BLD: 2.87 M/UL — LOW (ref 3.8–5.2)
RBC # FLD: 14.9 % — HIGH (ref 10.3–14.5)
SODIUM SERPL-SCNC: 139 MMOL/L — SIGNIFICANT CHANGE UP (ref 135–145)
WBC # BLD: 15.7 K/UL — HIGH (ref 3.8–10.5)
WBC # FLD AUTO: 15.7 K/UL — HIGH (ref 3.8–10.5)

## 2019-06-08 PROCEDURE — 99291 CRITICAL CARE FIRST HOUR: CPT

## 2019-06-08 PROCEDURE — 93010 ELECTROCARDIOGRAM REPORT: CPT

## 2019-06-08 PROCEDURE — 71045 X-RAY EXAM CHEST 1 VIEW: CPT | Mod: 26

## 2019-06-08 RX ORDER — SODIUM CHLORIDE 9 MG/ML
250 INJECTION, SOLUTION INTRAVENOUS ONCE
Refills: 0 | Status: COMPLETED | OUTPATIENT
Start: 2019-06-08 | End: 2019-06-08

## 2019-06-08 RX ORDER — PANTOPRAZOLE SODIUM 20 MG/1
40 TABLET, DELAYED RELEASE ORAL
Refills: 0 | Status: DISCONTINUED | OUTPATIENT
Start: 2019-06-08 | End: 2019-06-12

## 2019-06-08 RX ORDER — NICOTINE POLACRILEX 2 MG
1 GUM BUCCAL DAILY
Refills: 0 | Status: DISCONTINUED | OUTPATIENT
Start: 2019-06-08 | End: 2019-06-08

## 2019-06-08 RX ORDER — INSULIN LISPRO 100/ML
VIAL (ML) SUBCUTANEOUS AT BEDTIME
Refills: 0 | Status: DISCONTINUED | OUTPATIENT
Start: 2019-06-08 | End: 2019-06-10

## 2019-06-08 RX ORDER — ACETAMINOPHEN 500 MG
1000 TABLET ORAL ONCE
Refills: 0 | Status: COMPLETED | OUTPATIENT
Start: 2019-06-08 | End: 2019-06-08

## 2019-06-08 RX ORDER — INSULIN LISPRO 100/ML
VIAL (ML) SUBCUTANEOUS
Refills: 0 | Status: DISCONTINUED | OUTPATIENT
Start: 2019-06-08 | End: 2019-06-10

## 2019-06-08 RX ORDER — HEPARIN SODIUM 5000 [USP'U]/ML
5000 INJECTION INTRAVENOUS; SUBCUTANEOUS EVERY 8 HOURS
Refills: 0 | Status: DISCONTINUED | OUTPATIENT
Start: 2019-06-08 | End: 2019-06-08

## 2019-06-08 RX ORDER — ENOXAPARIN SODIUM 100 MG/ML
40 INJECTION SUBCUTANEOUS DAILY
Refills: 0 | Status: DISCONTINUED | OUTPATIENT
Start: 2019-06-08 | End: 2019-06-12

## 2019-06-08 RX ADMIN — Medication 81 MILLIGRAM(S): at 11:55

## 2019-06-08 RX ADMIN — Medication 100 MILLIGRAM(S): at 17:13

## 2019-06-08 RX ADMIN — PANTOPRAZOLE SODIUM 40 MILLIGRAM(S): 20 TABLET, DELAYED RELEASE ORAL at 17:26

## 2019-06-08 RX ADMIN — Medication 62.5 MILLIMOLE(S): at 03:45

## 2019-06-08 RX ADMIN — Medication 100 MILLIGRAM(S): at 21:13

## 2019-06-08 RX ADMIN — ENOXAPARIN SODIUM 40 MILLIGRAM(S): 100 INJECTION SUBCUTANEOUS at 11:55

## 2019-06-08 RX ADMIN — Medication 100 MILLIGRAM(S): at 01:10

## 2019-06-08 RX ADMIN — ONDANSETRON 4 MILLIGRAM(S): 8 TABLET, FILM COATED ORAL at 12:36

## 2019-06-08 RX ADMIN — Medication 400 MILLIGRAM(S): at 11:54

## 2019-06-08 RX ADMIN — Medication 1000 MILLIGRAM(S): at 12:09

## 2019-06-08 RX ADMIN — Medication 100 MILLIGRAM(S): at 08:35

## 2019-06-08 RX ADMIN — Medication 10 MILLIGRAM(S): at 21:13

## 2019-06-08 RX ADMIN — CHLORHEXIDINE GLUCONATE 1 APPLICATION(S): 213 SOLUTION TOPICAL at 06:02

## 2019-06-08 RX ADMIN — Medication 10 MILLIGRAM(S): at 06:01

## 2019-06-08 RX ADMIN — CHLORHEXIDINE GLUCONATE 5 MILLILITER(S): 213 SOLUTION TOPICAL at 02:59

## 2019-06-08 RX ADMIN — PANTOPRAZOLE SODIUM 40 MILLIGRAM(S): 20 TABLET, DELAYED RELEASE ORAL at 11:55

## 2019-06-08 RX ADMIN — Medication 10 MILLIGRAM(S): at 17:13

## 2019-06-08 RX ADMIN — SODIUM CHLORIDE 1000 MILLILITER(S): 9 INJECTION, SOLUTION INTRAVENOUS at 23:34

## 2019-06-08 RX ADMIN — CHLORHEXIDINE GLUCONATE 5 MILLILITER(S): 213 SOLUTION TOPICAL at 06:00

## 2019-06-08 NOTE — PROGRESS NOTE ADULT - SUBJECTIVE AND OBJECTIVE BOX
LIZ CASEY  MRN#:  [id number]    The patient is a 54y Female with PMH significant for asthma, and active heavy smoking, who presented recently with 3 month history of epigastric pain, nausea and vomiting and a 100 lb weight loss, found to have renal cell CA, now s/p right nephrectomy and IVC thrombectomy today, requiring sternotomy for circulatory arrest, who was seen, evaluated, & examined with the CTICU staff on rounds, overnight and during the early morning hours with a multidisciplinary care plan formulated & implemented.  All available clinical, laboratory, radiographic, pharmacologic, and electrocardiographic data were reviewed & analyzed.      The patient was in the CTICU in critical condition secondary to persistent cardiopulmonary dysfunction, hemodynamically significant anemia, vasogenic shock, persistent cardiovascular dysfunction, acidosis, & hyperglycemia.      Respiratory status required full ventilatory support with subsequent weaning to pressure support, close monitoring of respiratory rate and breathing pattern, the following of ABG’s with A-line monitoring, continuous pulse oximetry monitoring, initiation of an IV Dexmedetomidine infusion and weaning of an IV Propofol infusion for support & to evaluate for & prevent further decompensation secondary to persistent cardiopulmonary dysfunction with high risk of respiratory difficulty related to both sternal and chevron incisions.     Invasive hemodynamic monitoring with a central venous catheter & an A-line were required for the continuous central venous and MAP/BP monitoring to ensure adequate cardiovascular support and to evaluate for & help prevent decompensation while receiving intermittent volume expansion, an IV Vasopressin drip and weaning of an IV Norepinephrine infusion secondary to hemodynamically significant anemia/anemia due to acute blood loss, vasogenic shock, and resolving hypovolemia-shock and lactic acidosis.    Metabolic stability, stress hyperglycemia, & infection prophylaxis required an IV regular Insulin drip & the following of serial glucose levels to help achieve & maintain euglycemia.      Patient required acute postoperative critical care management and I provided 30 minutes of non-continuous care to the patient. Review and assessment of all available clinical, laboratory, radiographic, pharmacologic, and electrocardiographic data used in decision making to titrate medications and progress with weaning.  Discussed at length with the CTICU staff and helped coordinate care.

## 2019-06-08 NOTE — PROGRESS NOTE ADULT - SUBJECTIVE AND OBJECTIVE BOX
Day 1 of Anesthesia Pain Management Service    SUBJECTIVE: Patient doing well with PCEA    Pain Scale Score:   Refer to charted pain scores    THERAPY:  [X] Epidural Bupivacaine 0.0625% and Hydromorphone         [X] 10 micrograms/mL 	[ ] 5 micrograms/mL  [ ] Epidural Bupivacaine 0.0625% and Fentanyl 2 micrograms/mL  [ ] Epidural Ropivacaine 0.1% plain – 1 mg/mL    Demand dose: 3 mL  Lockout: 15 minutes  Continuous Rate: 6 mL/hr    MEDICATIONS  (STANDING):  aspirin enteric coated 81 milliGRAM(s) Oral daily  chlorhexidine 0.12% Liquid 5 milliLiter(s) Oral Mucosa every 4 hours  chlorhexidine 2% Cloths 1 Application(s) Topical <User Schedule>  docusate sodium 100 milliGRAM(s) Oral three times a day  enoxaparin Injectable 40 milliGRAM(s) SubCutaneous daily  HYDROmorphone (10 MICROgram(s)/mL) + BUpivacaine 0.0625% in 0.9% Sodium Chloride PCEA 250 milliLiter(s) Epidural PCA Continuous  insulin lispro (HumaLOG) corrective regimen sliding scale   SubCutaneous three times a day before meals  insulin lispro (HumaLOG) corrective regimen sliding scale   SubCutaneous at bedtime  metoclopramide Injectable 10 milliGRAM(s) IV Push every 8 hours  pantoprazole    Tablet 40 milliGRAM(s) Oral before breakfast  sodium chloride 0.9%. 1000 milliLiter(s) (10 mL/Hr) IV Continuous <Continuous>  vasopressin Infusion 0.033 Unit(s)/Min (2 mL/Hr) IV Continuous <Continuous>    MEDICATIONS  (PRN):  HYDROmorphone (10 MICROgram(s)/mL) + BUpivacaine 0.0625% in 0.9% Sodium Chloride PCEA Rescue Clinician  Bolus 5 milliLiter(s) Epidural every 15 minutes PRN for Pain Score greater than 6  naloxone Injectable 0.1 milliGRAM(s) IV Push every 3 minutes PRN For ANY of the following changes in patient status:  A. RR LESS THAN 10 breaths per minute, B. Oxygen saturation LESS THAN 90%, C. Sedation score of 6  ondansetron Injectable 4 milliGRAM(s) IV Push every 6 hours PRN Nausea      OBJECTIVE:    Assessment of Catheter Site:    [X] Epidural 	  [X] Dressing intact	[X] Site non-tender	[X] Site without erythema, discharge, edema  [X] Epidural tubing and connection checked	[X] Gross neurological exam within normal limits  [ ] Catheter removed – tip intact		[X] Afebrile	            [ ] Febrile: ___    PT/INR - ( 07 Jun 2019 18:45 )   PT: 13.7 sec;   INR: 1.20 ratio         PTT - ( 07 Jun 2019 18:45 )  PTT:32.0 sec                      9.4    15.7  )-----------( 157      ( 08 Jun 2019 02:14 )             26.4     Vital Signs Last 24 Hrs  T(C): 36.5 (06-08-19 @ 11:00), Max: 37 (06-07-19 @ 22:00)  T(F): 97.7 (06-08-19 @ 11:00), Max: 98.6 (06-07-19 @ 22:00)  HR: 75 (06-08-19 @ 17:45) (54 - 81)  BP: --  BP(mean): --  RR: 28 (06-08-19 @ 17:45) (6 - 66)  SpO2: 100% (06-08-19 @ 17:45) (98% - 100%)      Sedation Score:	[X] Alert	[ ] Drowsy	[ ] Arousable  [ ] Asleep     [ ] Unresponsive    Side Effects:	[X] None	[ ] Nausea	[ ] Vomiting   [ ] Pruritus  		[ ] Weakness     [ ] Numbness	[ ] Other:    ASSESSMENT/ PLAN:    Therapy:	[X] Continue   [ ] Discontinue   [ ] Change to PRN Analgesics   [ ] Change to PCA    Documentation and Verification of current medications:  [X] Done	[ ] Not done, not eligible, reason:

## 2019-06-08 NOTE — PHYSICAL THERAPY INITIAL EVALUATION ADULT - DID THE PATIENT HAVE SURGERY?
s/p radical right nephrectomy with ivc tumor thrombectomy and repair of IVC under circulatory arrest./yes

## 2019-06-08 NOTE — PHYSICAL THERAPY INITIAL EVALUATION ADULT - PERTINENT HX OF CURRENT PROBLEM, REHAB EVAL
55 yo F transfer from [place]  for findings of 9cm R renal mass. Pt reports she has been having epigastric pain, a poor appetite, and nausea and vomiting for at least 3 months. She reports she has lost ~100lbs within this time period as well. She reports weakness and fatigue as well.
55 yo F w/ PMHx significant for asthma, and active heavy smoking, presenting as a transfer from [facility] for findings of 9cm right renal mass.  She reports she has lost nearly 100lbs. Pt found to have renal cell CA: CT revealed a  tumor thrombus that extends to the IVC to level of upper intrahepatic IVC, right middle lobe and left lower lobe pulm nodules.; Pt now s/p right nephrectomy and IVC thrombectomy today, requiring sternotomy for circulatory arrest,

## 2019-06-08 NOTE — AIRWAY REMOVAL NOTE  ADULT & PEDS - ARTIFICAL AIRWAY REMOVAL COMMENTS
Written order for extubation verified. The patient was identified by full name and birth date compared to the identification band. Present during the procedure was Lisa koo

## 2019-06-08 NOTE — PHYSICAL THERAPY INITIAL EVALUATION ADULT - ADDITIONAL COMMENTS
pt lives in 4th floor elevated building, no steps to enter. Prior to admission, pt was I with all functional mobility and ADLs without AD.
pt lives in 4th floor elevated building w/ dtr and grand children;  no steps to enter. Prior to admission, pt was I with all functional mobility and ADLs without AD.

## 2019-06-08 NOTE — PROGRESS NOTE ADULT - SUBJECTIVE AND OBJECTIVE BOX
Post op Check    Pt 55y/o F hx of Renal mass, s/p R radical nephrectomy with IVC tumor thrombectomy, primary closure of IVC, cardiopulmonary bypass and arrest  seen and examined, intubated and under sedation     Vital Signs Last 24 Hrs  T(C): 36.7 (08 Jun 2019 01:00), Max: 37 (07 Jun 2019 22:00)  T(F): 98.1 (08 Jun 2019 01:00), Max: 98.6 (07 Jun 2019 22:00)  HR: 72 (08 Jun 2019 01:00) (71 - 99)  BP: 115/86 (07 Jun 2019 06:45) (115/86 - 119/79)  BP(mean): --  RR: 18 (08 Jun 2019 01:00) (15 - 30)  SpO2: 100% (08 Jun 2019 01:00) (97% - 100%)    I&O's Summary    06 Jun 2019 07:01  -  07 Jun 2019 07:00  --------------------------------------------------------  IN: 600 mL / OUT: 400 mL / NET: 200 mL    07 Jun 2019 07:01  -  08 Jun 2019 01:14  --------------------------------------------------------  IN: 282.8 mL / OUT: 1260 mL / NET: -977.2 mL        Physical Exam  Gen: intubated and under sedation    Pulm: chest tube in place, draining SS. dressing on the incision site dry and clean.   CV: RRR, no JVD  Abd: Soft, NT, ND  : Gomes in place, draining yellow urine                          9.6    11.4  )-----------( CLUMPED    ( 07 Jun 2019 18:45 )             28.1       06-07    139  |  101  |  10  ----------------------------<  202<H>  4.9   |  23  |  0.87    Ca    10.7<H>      07 Jun 2019 18:45  Phos  1.9     06-07  Mg     1.7     06-07    TPro  5.3<L>  /  Alb  3.8  /  TBili  2.1<H>  /  DBili  x   /  AST  103<H>  /  ALT  56<H>  /  AlkPhos  42  06-07

## 2019-06-08 NOTE — PROGRESS NOTE ADULT - SUBJECTIVE AND OBJECTIVE BOX
doing well, extubated, low doses mikhail/vaso but able to be off at times as well    T(F): 98.1, Max: 98.6 (06-07-19 @ 22:00)  HR: 75  BP: --  SpO2: 100%    OUT:    Indwelling Catheter - Urethral: 1850 mL  Total OUT: 1850 mL    Gen NAD  Chest: mediastinal tube in place, minimal output  Abd soft, incision CDI   lozano clear yellow      06-08 @ 02:14    WBC 15.7  / Hct 26.4  / SCr 1.07     06-07 @ 18:45    WBC 11.4  / Hct 28.1  / SCr 0.87

## 2019-06-08 NOTE — PROGRESS NOTE ADULT - ASSESSMENT
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

## 2019-06-08 NOTE — PHYSICAL THERAPY INITIAL EVALUATION ADULT - GENERAL OBSERVATIONS, REHAB EVAL
sitting in chair, +Chest tube, PCA pump, Gomes, A-line, RIJ, NCO2 2L
pt found semi supine +tele +IVL

## 2019-06-08 NOTE — PROGRESS NOTE ADULT - ASSESSMENT
A/P: 54y Female s/p combined surgery June 6: R radical nephrectomy with IVC tumor thrombectomy, primary closure of IVC, cardiopulmonary bypass and arrest    plan  Remove lozano catheter when no longer needed, ideally this morning  CLD when able to tolerate as per CTICU  monitor GI function, when passing consistent flatus will consider advancing further  DVT prophylaxis/OOB  Strict I&O's A/P: 54y Female s/p combined surgery June 6: R radical nephrectomy with IVC tumor thrombectomy, primary closure of IVC, cardiopulmonary bypass and arrest    plan  Remove lozano catheter when no longer needed, ideally this morning  CLD when able to tolerate as per CTICU  monitor GI function, when passing consistent flatus will consider advancing further  DVT prophylaxis/OOB  Strict I&O's  appreciate CTICU care A/P: 54y Female s/p combined surgery June 6: R radical nephrectomy with IVC tumor thrombectomy, primary closure of IVC, cardiopulmonary bypass and arrest    plan  Keep catheter for I/O until pressor requirement resolved  CLD when able to tolerate as per CTICU  monitor GI function, when passing consistent flatus will consider advancing further  DVT prophylaxis/OOB  Strict I&O's  appreciate CTICU care

## 2019-06-08 NOTE — PROGRESS NOTE ADULT - ASSESSMENT
A/P: 54y Female s/p R radical nephrectomy with IVC tumor thrombectomy, primary closure of IVC, cardiopulmonary bypass and arrest. Intubated and under sedation. VSS.     plan  DVT prophylaxis/OOB  Strict I&O's

## 2019-06-08 NOTE — PROGRESS NOTE ADULT - SUBJECTIVE AND OBJECTIVE BOX
Subjective: Patient seen and examined. No new events except as noted.     SUBJECTIVE/ROS:  extubated   a bit obtunded       MEDICATIONS:  MEDICATIONS  (STANDING):  acetaminophen  IVPB .. 1000 milliGRAM(s) IV Intermittent once  aspirin enteric coated 81 milliGRAM(s) Oral daily  cefuroxime  IVPB 1500 milliGRAM(s) IV Intermittent every 8 hours  chlorhexidine 0.12% Liquid 5 milliLiter(s) Oral Mucosa every 4 hours  chlorhexidine 2% Cloths 1 Application(s) Topical <User Schedule>  docusate sodium 100 milliGRAM(s) Oral three times a day  enoxaparin Injectable 40 milliGRAM(s) SubCutaneous daily  HYDROmorphone (10 MICROgram(s)/mL) + BUpivacaine 0.0625% in 0.9% Sodium Chloride PCEA 250 milliLiter(s) Epidural PCA Continuous  insulin lispro (HumaLOG) corrective regimen sliding scale   SubCutaneous three times a day before meals  insulin lispro (HumaLOG) corrective regimen sliding scale   SubCutaneous at bedtime  insulin regular Infusion 8 Unit(s)/Hr (8 mL/Hr) IV Continuous <Continuous>  metoclopramide Injectable 10 milliGRAM(s) IV Push every 8 hours  norepinephrine Infusion 0.05 MICROgram(s)/kG/Min (5.616 mL/Hr) IV Continuous <Continuous>  pantoprazole  Injectable 40 milliGRAM(s) IV Push daily  sodium chloride 0.9%. 1000 milliLiter(s) (10 mL/Hr) IV Continuous <Continuous>  vasopressin Infusion 0.033 Unit(s)/Min (2 mL/Hr) IV Continuous <Continuous>      PHYSICAL EXAM:  T(C): 36.7 (06-08-19 @ 07:00), Max: 37 (06-07-19 @ 22:00)  HR: 71 (06-08-19 @ 08:00) (54 - 81)  BP: -- 109/70  RR: 14 (06-08-19 @ 08:00) (11 - 30)  SpO2: 100% (06-08-19 @ 08:00) (100% - 100%)  Wt(kg): --  I&O's Summary    07 Jun 2019 07:01  -  08 Jun 2019 07:00  --------------------------------------------------------  IN: 651.8 mL / OUT: 1905 mL / NET: -1253.2 mL    08 Jun 2019 07:01  -  08 Jun 2019 08:37  --------------------------------------------------------  IN: 18 mL / OUT: 195 mL / NET: -177 mL            JVP: Normal  Neck: supple  Lung: clear   CV: S1 S2 , Murmur:  Abd: soft  Ext: No edema  neuro: Awake / alert  Psych: flat affect  Skin: normal``    LABS/DATA:    CARDIAC MARKERS:  CARDIAC MARKERS ( 07 Jun 2019 18:45 )  x     / x     / 491 U/L / x     / 42.5 ng/mL                                9.4    15.7  )-----------( 157      ( 08 Jun 2019 02:14 )             26.4     06-08    139  |  101  |  13  ----------------------------<  158<H>  4.9   |  25  |  1.07    Ca    9.8      08 Jun 2019 02:14  Phos  1.9     06-07  Mg     1.7     06-07    TPro  5.2<L>  /  Alb  3.5  /  TBili  1.5<H>  /  DBili  x   /  AST  127<H>  /  ALT  54<H>  /  AlkPhos  41  06-08    proBNP:   Lipid Profile:   HgA1c:   TSH:     TELE:  EKG:

## 2019-06-09 LAB
ALBUMIN SERPL ELPH-MCNC: 3.2 G/DL — LOW (ref 3.3–5)
ALBUMIN SERPL ELPH-MCNC: 3.3 G/DL — SIGNIFICANT CHANGE UP (ref 3.3–5)
ALP SERPL-CCNC: 46 U/L — SIGNIFICANT CHANGE UP (ref 40–120)
ALP SERPL-CCNC: 52 U/L — SIGNIFICANT CHANGE UP (ref 40–120)
ALT FLD-CCNC: 38 U/L — SIGNIFICANT CHANGE UP (ref 10–45)
ALT FLD-CCNC: 38 U/L — SIGNIFICANT CHANGE UP (ref 10–45)
ANION GAP SERPL CALC-SCNC: 11 MMOL/L — SIGNIFICANT CHANGE UP (ref 5–17)
ANION GAP SERPL CALC-SCNC: 13 MMOL/L — SIGNIFICANT CHANGE UP (ref 5–17)
APPEARANCE UR: ABNORMAL
APTT BLD: 30 SEC — SIGNIFICANT CHANGE UP (ref 27.5–36.3)
AST SERPL-CCNC: 55 U/L — HIGH (ref 10–40)
AST SERPL-CCNC: 60 U/L — HIGH (ref 10–40)
BILIRUB SERPL-MCNC: 0.6 MG/DL — SIGNIFICANT CHANGE UP (ref 0.2–1.2)
BILIRUB SERPL-MCNC: 0.8 MG/DL — SIGNIFICANT CHANGE UP (ref 0.2–1.2)
BILIRUB UR-MCNC: NEGATIVE — SIGNIFICANT CHANGE UP
BUN SERPL-MCNC: 19 MG/DL — SIGNIFICANT CHANGE UP (ref 7–23)
BUN SERPL-MCNC: 20 MG/DL — SIGNIFICANT CHANGE UP (ref 7–23)
CALCIUM SERPL-MCNC: 8.7 MG/DL — SIGNIFICANT CHANGE UP (ref 8.4–10.5)
CALCIUM SERPL-MCNC: 8.9 MG/DL — SIGNIFICANT CHANGE UP (ref 8.4–10.5)
CHLORIDE SERPL-SCNC: 101 MMOL/L — SIGNIFICANT CHANGE UP (ref 96–108)
CHLORIDE SERPL-SCNC: 104 MMOL/L — SIGNIFICANT CHANGE UP (ref 96–108)
CO2 SERPL-SCNC: 24 MMOL/L — SIGNIFICANT CHANGE UP (ref 22–31)
CO2 SERPL-SCNC: 25 MMOL/L — SIGNIFICANT CHANGE UP (ref 22–31)
COLOR SPEC: SIGNIFICANT CHANGE UP
CREAT SERPL-MCNC: 1.37 MG/DL — HIGH (ref 0.5–1.3)
CREAT SERPL-MCNC: 1.49 MG/DL — HIGH (ref 0.5–1.3)
DIFF PNL FLD: ABNORMAL
GAS PNL BLDA: SIGNIFICANT CHANGE UP
GLUCOSE BLDC GLUCOMTR-MCNC: 87 MG/DL — SIGNIFICANT CHANGE UP (ref 70–99)
GLUCOSE BLDC GLUCOMTR-MCNC: 90 MG/DL — SIGNIFICANT CHANGE UP (ref 70–99)
GLUCOSE SERPL-MCNC: 101 MG/DL — HIGH (ref 70–99)
GLUCOSE SERPL-MCNC: 96 MG/DL — SIGNIFICANT CHANGE UP (ref 70–99)
GLUCOSE UR QL: NEGATIVE — SIGNIFICANT CHANGE UP
HCT VFR BLD CALC: 25.9 % — LOW (ref 34.5–45)
HCT VFR BLD CALC: 29.6 % — LOW (ref 34.5–45)
HCT VFR BLD CALC: 30.7 % — LOW (ref 34.5–45)
HGB BLD-MCNC: 10.2 G/DL — LOW (ref 11.5–15.5)
HGB BLD-MCNC: 10.7 G/DL — LOW (ref 11.5–15.5)
HGB BLD-MCNC: 8.9 G/DL — LOW (ref 11.5–15.5)
INR BLD: 1.08 RATIO — SIGNIFICANT CHANGE UP (ref 0.88–1.16)
KETONES UR-MCNC: NEGATIVE — SIGNIFICANT CHANGE UP
LEUKOCYTE ESTERASE UR-ACNC: NEGATIVE — SIGNIFICANT CHANGE UP
MAGNESIUM SERPL-MCNC: 2.2 MG/DL — SIGNIFICANT CHANGE UP (ref 1.6–2.6)
MCHC RBC-ENTMCNC: 32.1 PG — SIGNIFICANT CHANGE UP (ref 27–34)
MCHC RBC-ENTMCNC: 32.3 PG — SIGNIFICANT CHANGE UP (ref 27–34)
MCHC RBC-ENTMCNC: 32.6 PG — SIGNIFICANT CHANGE UP (ref 27–34)
MCHC RBC-ENTMCNC: 34.4 GM/DL — SIGNIFICANT CHANGE UP (ref 32–36)
MCHC RBC-ENTMCNC: 34.4 GM/DL — SIGNIFICANT CHANGE UP (ref 32–36)
MCHC RBC-ENTMCNC: 34.9 GM/DL — SIGNIFICANT CHANGE UP (ref 32–36)
MCV RBC AUTO: 93.4 FL — SIGNIFICANT CHANGE UP (ref 80–100)
MCV RBC AUTO: 93.5 FL — SIGNIFICANT CHANGE UP (ref 80–100)
MCV RBC AUTO: 94 FL — SIGNIFICANT CHANGE UP (ref 80–100)
NITRITE UR-MCNC: NEGATIVE — SIGNIFICANT CHANGE UP
PH UR: 6.5 — SIGNIFICANT CHANGE UP (ref 5–8)
PHOSPHATE SERPL-MCNC: 5 MG/DL — HIGH (ref 2.5–4.5)
PLATELET # BLD AUTO: 167 K/UL — SIGNIFICANT CHANGE UP (ref 150–400)
PLATELET # BLD AUTO: 169 K/UL — SIGNIFICANT CHANGE UP (ref 150–400)
PLATELET # BLD AUTO: 194 K/UL — SIGNIFICANT CHANGE UP (ref 150–400)
POTASSIUM SERPL-MCNC: 4.6 MMOL/L — SIGNIFICANT CHANGE UP (ref 3.5–5.3)
POTASSIUM SERPL-MCNC: 4.7 MMOL/L — SIGNIFICANT CHANGE UP (ref 3.5–5.3)
POTASSIUM SERPL-SCNC: 4.6 MMOL/L — SIGNIFICANT CHANGE UP (ref 3.5–5.3)
POTASSIUM SERPL-SCNC: 4.7 MMOL/L — SIGNIFICANT CHANGE UP (ref 3.5–5.3)
PROCALCITONIN SERPL-MCNC: 1.34 NG/ML — HIGH (ref 0.02–0.1)
PROT SERPL-MCNC: 5.4 G/DL — LOW (ref 6–8.3)
PROT SERPL-MCNC: 5.4 G/DL — LOW (ref 6–8.3)
PROT UR-MCNC: ABNORMAL
PROTHROM AB SERPL-ACNC: 12.3 SEC — SIGNIFICANT CHANGE UP (ref 10–12.9)
RBC # BLD: 2.76 M/UL — LOW (ref 3.8–5.2)
RBC # BLD: 3.17 M/UL — LOW (ref 3.8–5.2)
RBC # BLD: 3.29 M/UL — LOW (ref 3.8–5.2)
RBC # FLD: 15.2 % — HIGH (ref 10.3–14.5)
RBC # FLD: 15.4 % — HIGH (ref 10.3–14.5)
RBC # FLD: 15.4 % — HIGH (ref 10.3–14.5)
SODIUM SERPL-SCNC: 137 MMOL/L — SIGNIFICANT CHANGE UP (ref 135–145)
SODIUM SERPL-SCNC: 141 MMOL/L — SIGNIFICANT CHANGE UP (ref 135–145)
SP GR SPEC: 1.01 — SIGNIFICANT CHANGE UP (ref 1.01–1.02)
TSH SERPL-MCNC: 2.95 UIU/ML — SIGNIFICANT CHANGE UP (ref 0.27–4.2)
UROBILINOGEN FLD QL: NEGATIVE — SIGNIFICANT CHANGE UP
WBC # BLD: 23.4 K/UL — HIGH (ref 3.8–10.5)
WBC # BLD: 23.6 K/UL — HIGH (ref 3.8–10.5)
WBC # BLD: 24.3 K/UL — HIGH (ref 3.8–10.5)
WBC # FLD AUTO: 23.4 K/UL — HIGH (ref 3.8–10.5)
WBC # FLD AUTO: 23.6 K/UL — HIGH (ref 3.8–10.5)
WBC # FLD AUTO: 24.3 K/UL — HIGH (ref 3.8–10.5)

## 2019-06-09 PROCEDURE — 93010 ELECTROCARDIOGRAM REPORT: CPT

## 2019-06-09 PROCEDURE — 99291 CRITICAL CARE FIRST HOUR: CPT

## 2019-06-09 PROCEDURE — 71045 X-RAY EXAM CHEST 1 VIEW: CPT | Mod: 26

## 2019-06-09 RX ADMIN — Medication 10 MILLIGRAM(S): at 13:07

## 2019-06-09 RX ADMIN — Medication 100 MILLIGRAM(S): at 13:07

## 2019-06-09 RX ADMIN — PANTOPRAZOLE SODIUM 40 MILLIGRAM(S): 20 TABLET, DELAYED RELEASE ORAL at 08:30

## 2019-06-09 RX ADMIN — Medication 10 MILLIGRAM(S): at 05:03

## 2019-06-09 RX ADMIN — CHLORHEXIDINE GLUCONATE 1 APPLICATION(S): 213 SOLUTION TOPICAL at 05:02

## 2019-06-09 RX ADMIN — Medication 100 MILLIGRAM(S): at 21:13

## 2019-06-09 RX ADMIN — Medication 100 MILLIGRAM(S): at 05:03

## 2019-06-09 RX ADMIN — Medication 81 MILLIGRAM(S): at 13:07

## 2019-06-09 NOTE — PROGRESS NOTE ADULT - ASSESSMENT
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

## 2019-06-09 NOTE — PROGRESS NOTE ADULT - SUBJECTIVE AND OBJECTIVE BOX
Subjective: Patient seen and examined. No new events except as noted.     SUBJECTIVE/ROS:  sitting in chair  No chest pain, dyspnea, palpitation, or dizziness.       MEDICATIONS:  MEDICATIONS  (STANDING):  aspirin enteric coated 81 milliGRAM(s) Oral daily  chlorhexidine 2% Cloths 1 Application(s) Topical <User Schedule>  docusate sodium 100 milliGRAM(s) Oral three times a day  enoxaparin Injectable 40 milliGRAM(s) SubCutaneous daily  HYDROmorphone (10 MICROgram(s)/mL) + BUpivacaine 0.0625% in 0.9% Sodium Chloride PCEA 250 milliLiter(s) Epidural PCA Continuous  insulin lispro (HumaLOG) corrective regimen sliding scale   SubCutaneous three times a day before meals  insulin lispro (HumaLOG) corrective regimen sliding scale   SubCutaneous at bedtime  metoclopramide Injectable 10 milliGRAM(s) IV Push every 8 hours  pantoprazole    Tablet 40 milliGRAM(s) Oral before breakfast  sodium chloride 0.9%. 1000 milliLiter(s) (10 mL/Hr) IV Continuous <Continuous>  vasopressin Infusion 0.033 Unit(s)/Min (2 mL/Hr) IV Continuous <Continuous>      PHYSICAL EXAM:  T(C): 37.2 (06-09-19 @ 08:00), Max: 37.2 (06-09-19 @ 08:00)  HR: 92 (06-09-19 @ 10:00) (65 - 92)  BP: -- 128/80  RR: 24 (06-09-19 @ 10:00) (0 - 66)  SpO2: 98% (06-09-19 @ 10:00) (86% - 100%)  Wt(kg): --  I&O's Summary    08 Jun 2019 07:01  -  09 Jun 2019 07:00  --------------------------------------------------------  IN: 1127 mL / OUT: 1135 mL / NET: -8 mL    09 Jun 2019 07:01  -  09 Jun 2019 11:14  --------------------------------------------------------  IN: 30 mL / OUT: 130 mL / NET: -100 mL            JVP: Normal  Neck: supple  Lung: clear   CV: S1 S2 , Murmur:  Abd: soft  Ext: No edema  neuro: Awake / alert  Psych: flat affect  Skin: normal``    LABS/DATA:    CARDIAC MARKERS:  CARDIAC MARKERS ( 07 Jun 2019 18:45 )  x     / x     / 491 U/L / x     / 42.5 ng/mL                                10.2   23.6  )-----------( 169      ( 09 Jun 2019 05:28 )             29.6     06-09    141  |  104  |  20  ----------------------------<  96  4.7   |  24  |  1.37<H>    Ca    8.7      09 Jun 2019 05:28  Phos  5.0     06-09  Mg     2.2     06-09    TPro  5.4<L>  /  Alb  3.2<L>  /  TBili  0.8  /  DBili  x   /  AST  55<H>  /  ALT  38  /  AlkPhos  52  06-09    proBNP:   Lipid Profile:   HgA1c: Hemoglobin A1C, Whole Blood: 5.4 % (06-08 @ 12:29)    TSH:     TELE:  EKG:

## 2019-06-09 NOTE — PROGRESS NOTE ADULT - SUBJECTIVE AND OBJECTIVE BOX
7pm-7:30pm     Remained critically ill on continuos ICU monitoring.     OBJECTIVE:  ICU Vital Signs Last 24 Hrs  T(C): 38 (2019 20:00), Max: 38 (2019 20:00)  T(F): 100.4 (2019 20:00), Max: 100.4 (2019 20:00)  HR: 113 (2019 19:00) (77 - 113)  BP: --  BP(mean): --  ABP: 105/66 (2019 19:00) (100/53 - 129/69)  ABP(mean): 82 (2019 19:00) (67 - 89)  RR: 38 (:00) (0 - 38)  SpO2: 98% (:00) (86% - 100%)         @ 07:  -   @ 07:00  --------------------------------------------------------  IN: 1127 mL / OUT: 1135 mL / NET: -8 mL     @ 07:  -   @ 20:35  --------------------------------------------------------  IN: 130 mL / OUT: 750 mL / NET: -620 mL      CAPILLARY BLOOD GLUCOSE  74 (2019 08:00)      POCT Blood Glucose.: 90 mg/dL (2019 08:29)      PHYSICAL EXAM:     Daily Weight in k.2 (2019 00:00)  General: in bed on cont ICU monitoring   Neurology: A&Ox3, nonfocal, HUMMEL x 4  Eyes: PERRLA/ EOMI, Gross vision intact  ENT/Neck: Neck supple, trachea midline, No JVD, Gross hearing intact  Respiratory:  B/L, fine  rales, + sternal dressing   CV: RRR, S1S2, no murmurs, rubs or gallops  Abdominal: Soft, NT, ND +BS, + incision   Extremities: No edema, + peripheral pulses  Skin: No Rashes, Hematoma, Ecchymosis       HOSPITAL MEDICATIONS:  MEDICATIONS  (STANDING):  aspirin enteric coated 81 milliGRAM(s) Oral daily  chlorhexidine 2% Cloths 1 Application(s) Topical <User Schedule>  docusate sodium 100 milliGRAM(s) Oral three times a day  enoxaparin Injectable 40 milliGRAM(s) SubCutaneous daily  insulin lispro (HumaLOG) corrective regimen sliding scale   SubCutaneous three times a day before meals  insulin lispro (HumaLOG) corrective regimen sliding scale   SubCutaneous at bedtime  pantoprazole    Tablet 40 milliGRAM(s) Oral before breakfast  sodium chloride 0.9%. 1000 milliLiter(s) (10 mL/Hr) IV Continuous <Continuous>    MEDICATIONS  (PRN):      LABS:                        10.7   24.3  )-----------( 194      ( 2019 11:08 )             30.7         141  |  104  |  20  ----------------------------<  96  4.7   |  24  |  1.37<H>    Ca    8.7      2019 05:28  Phos  5.0       Mg     2.2         TPro  5.4<L>  /  Alb  3.2<L>  /  TBili  0.8  /  DBili  x   /  AST  55<H>  /  ALT  38  /  AlkPhos  52      PT/INR - ( 2019 11:26 )   PT: 12.3 sec;   INR: 1.08 ratio         PTT - ( 2019 11:26 )  PTT:30.0 sec    Arterial Blood Gas:   @ 17:30  7.50/33/77/25/96/2.8  ABG lactate: --  Arterial Blood Gas:   @ 11:05  7.46/37/76/26/96/2.2  ABG lactate: --  Arterial Blood Gas:   @ 00:43  7.40/43/108/26/99/2.1  ABG lactate: --  Arterial Blood Gas:   @ 13:04  7.36/48/101/27/98/1.6  ABG lactate: --  Arterial Blood Gas:   @ 07:49  7.39/46/115/27/98/2.3  ABG lactate: --  Arterial Blood Gas:   @ 06:36  7.38/46/175/27/99/2.0  ABG lactate: --  Arterial Blood Gas:   @ 02:11  7.46/38/178/27/100/3.1  ABG lactate: --  Arterial Blood Gas:   @ 21:12  7.48/36/312/27/100/3.4  ABG lactate: --        LIVER FUNCTIONS - ( 2019 05:28 )  Alb: 3.2 g/dL / Pro: 5.4 g/dL / ALK PHOS: 52 U/L / ALT: 38 U/L / AST: 55 U/L / GGT: x           MICROBIOLOGY:     RADIOLOGY:  X Reviewed and interpreted by me

## 2019-06-09 NOTE — PROGRESS NOTE ADULT - ASSESSMENT
A/P: 54y Female s/p R radical nephrectomy with IVC tumor thrombectomy, primary closure of IVC, cardiopulmonary bypass and arrest. Intubated and under sedation. VSS.       DVT prophylaxis/OOB  Strict I&O's A/P: 54y Female s/p combined surgery June 6: R radical nephrectomy with IVC tumor thrombectomy, primary closure of IVC, cardiopulmonary bypass and arrest      Keep catheter for I/O until pressor requirement resolved  CLD when able to tolerate as per CTICU  monitor GI function, when passing consistent flatus will consider advancing further  DVT prophylaxis/OOB  Strict I&O's  appreciate CTICU care A/P: 54y Female s/p combined surgery June 6: R radical nephrectomy with IVC tumor thrombectomy, primary closure of IVC, cardiopulmonary bypass and arrest      Keep catheter for I/O until pressor requirement resolved  CLD when able to tolerate as per CTICU  monitor GI function, when passing consistent flatus will consider advancing further  DVT prophylaxis/OOB/ambulate as tolerated  Strict I&O's  appreciate CTICU care A/P: 54y Female s/p combined surgery June 7: R radical nephrectomy with IVC tumor thrombectomy, primary closure of IVC, cardiopulmonary bypass and circulatory arrest      Keep catheter for I/O until pressor requirement resolved  CLD when able to tolerate as per CTICU  monitor GI function, when passing consistent flatus will consider advancing further  DVT prophylaxis/OOB/ambulate as tolerated  Strict I&O's  appreciate CTICU care

## 2019-06-09 NOTE — PROGRESS NOTE ADULT - SUBJECTIVE AND OBJECTIVE BOX
[facility] VASCULAR SURGERY DAILY PROGRESS NOTE    SUBJECTIVE:  -  doing well post-op, pain well-controlled    OBJECTIVE:    Vital Signs Last 24 Hrs  T(C): 37.2 (09 Jun 2019 08:00), Max: 37.2 (09 Jun 2019 08:00)  T(F): 99 (09 Jun 2019 08:00), Max: 99 (09 Jun 2019 08:00)  HR: 92 (09 Jun 2019 10:00) (65 - 92)  BP: --  BP(mean): --  RR: 24 (09 Jun 2019 10:00) (0 - 66)  SpO2: 98% (09 Jun 2019 10:00) (86% - 100%)    EXAM:  Gen:       alert, in NAD  Lungs:       unlabored breathing  CV:       regular rate, rhythm  Abd:       nondistended, appropriately tender over surgical site                 incisions clean, dry, intact                 lozano in place, set to gravity, functioning  Ext:        b/l lower extremities wrapped in ace wrap               minimal LE edema bilaterally

## 2019-06-09 NOTE — PROGRESS NOTE ADULT - SUBJECTIVE AND OBJECTIVE BOX
UROLOGY DAILY PROGRESS NOTE:     Subjective: Patient seen and examined at bedside. No overnight events.       Objective:  Vital signs  T(F): , Max: 98.4 (06-09-19 @ 00:00)  HR: 84 (06-09-19 @ 06:00)  BP: --  SpO2: 99% (06-09-19 @ 06:00)  Wt(kg): --    I&O's Summary    07 Jun 2019 07:01  -  08 Jun 2019 07:00  --------------------------------------------------------  IN: 651.8 mL / OUT: 1905 mL / NET: -1253.2 mL    08 Jun 2019 07:01  -  09 Jun 2019 06:28  --------------------------------------------------------  IN: 1117 mL / OUT: 1095 mL / NET: 22 mL        Gen: NAD  Pulm: No respiratory distress, no subcostal retractions  CV: RRR, no JVD  Abd: Soft, NT, ND  :    Labs:  06-09  23.6  / 29.6  /1.37   06-09  23.4  / 25.9  /1.49                           10.2   23.6  )-----------( 169      ( 09 Jun 2019 05:28 )             29.6     06-09    141  |  104  |  20  ----------------------------<  96  4.7   |  24  |  1.37<H>    Ca    8.7      09 Jun 2019 05:28  Phos  5.0     06-09  Mg     2.2     06-09    TPro  5.4<L>  /  Alb  3.2<L>  /  TBili  0.8  /  DBili  x   /  AST  55<H>  /  ALT  38  /  AlkPhos  52  06-09    PT/INR - ( 07 Jun 2019 18:45 )   PT: 13.7 sec;   INR: 1.20 ratio         PTT - ( 07 Jun 2019 18:45 )  PTT:32.0 sec     MRSA/MSSA PCR (06.06.19 @ 15:05)    MRSA PCR Result.: NotDetec: MRSA/MSSA PCR assay is a qualitative in vitro diagnostic test for the  direct detection and differentiation of methicillin-resistant  Staphylococcus aureus (MRSA) from Staphylococcus aureus (SA). The assay  detects DNA from nasal swabs in patients atrisk for nasal colonization.  It is not intended to diagnose MRSA or SA infections nor guide or monitor  treatment for MRSA/SA infections. A negative result does not preclude  nasal colonization. The assay is FDA-approved and its performance has  been established by [employer], [place] and the [facility], [place], [place].    Staph Aureus PCR Result: Detected UROLOGY DAILY PROGRESS NOTE:     Subjective: Patient seen and examined at bedside. No overnight events. Reports no pain. Patient reports is not passing gas. Reports has been ambulating.       Objective:  Vital signs  T(F): , Max: 98.4 (06-09-19 @ 00:00)  HR: 84 (06-09-19 @ 06:00)  BP: --  SpO2: 99% (06-09-19 @ 06:00)  Wt(kg): --    I&O's Summary    07 Jun 2019 07:01  -  08 Jun 2019 07:00  --------------------------------------------------------  IN: 651.8 mL / OUT: 1905 mL / NET: -1253.2 mL    08 Jun 2019 07:01  -  09 Jun 2019 06:28  --------------------------------------------------------  IN: 1117 mL / OUT: 1095 mL / NET: 22 mL        Gen: NAD  Pulm: No respiratory distress, no subcostal retractions  CV: RRR, no JVD, mediastinal tube in place  Abd: Soft, ND, incision CDI  : lozano draining clear yellow urine    Labs:  06-09  23.6  / 29.6  /1.37   06-09  23.4  / 25.9  /1.49                           10.2   23.6  )-----------( 169      ( 09 Jun 2019 05:28 )             29.6     06-09    141  |  104  |  20  ----------------------------<  96  4.7   |  24  |  1.37<H>    Ca    8.7      09 Jun 2019 05:28  Phos  5.0     06-09  Mg     2.2     06-09    TPro  5.4<L>  /  Alb  3.2<L>  /  TBili  0.8  /  DBili  x   /  AST  55<H>  /  ALT  38  /  AlkPhos  52  06-09    PT/INR - ( 07 Jun 2019 18:45 )   PT: 13.7 sec;   INR: 1.20 ratio         PTT - ( 07 Jun 2019 18:45 )  PTT:32.0 sec     MRSA/MSSA PCR (06.06.19 @ 15:05)    MRSA PCR Result.: NotDetec: MRSA/MSSA PCR assay is a qualitative in vitro diagnostic test for the  direct detection and differentiation of methicillin-resistant  Staphylococcus aureus (MRSA) from Staphylococcus aureus (SA). The assay  detects DNA from nasal swabs in patients atrisk for nasal colonization.  It is not intended to diagnose MRSA or SA infections nor guide or monitor  treatment for MRSA/SA infections. A negative result does not preclude  nasal colonization. The assay is FDA-approved and its performance has  been established by [employer], [place] and the [facility], [place], [place].    Staph Aureus PCR Result: Detected

## 2019-06-09 NOTE — PROGRESS NOTE ADULT - SUBJECTIVE AND OBJECTIVE BOX
Day 2 of Anesthesia Pain Management Service    SUBJECTIVE: Patient doing well with PCEA    Pain Scale Score:   Refer to charted pain scores    THERAPY:  [X] Epidural Bupivacaine 0.0625% and Hydromorphone         [X] 10 micrograms/mL 	[ ] 5 micrograms/mL  [ ] Epidural Bupivacaine 0.0625% and Fentanyl 2 micrograms/mL  [ ] Epidural Ropivacaine 0.1% plain – 1 mg/mL    Demand dose: 3 mL  Lockout: 15 minutes  Continuous Rate: 6 mL/hr    MEDICATIONS  (STANDING):  aspirin enteric coated 81 milliGRAM(s) Oral daily  chlorhexidine 2% Cloths 1 Application(s) Topical <User Schedule>  docusate sodium 100 milliGRAM(s) Oral three times a day  enoxaparin Injectable 40 milliGRAM(s) SubCutaneous daily  HYDROmorphone (10 MICROgram(s)/mL) + BUpivacaine 0.0625% in 0.9% Sodium Chloride PCEA 250 milliLiter(s) Epidural PCA Continuous  insulin lispro (HumaLOG) corrective regimen sliding scale   SubCutaneous three times a day before meals  insulin lispro (HumaLOG) corrective regimen sliding scale   SubCutaneous at bedtime  metoclopramide Injectable 10 milliGRAM(s) IV Push every 8 hours  pantoprazole    Tablet 40 milliGRAM(s) Oral before breakfast  sodium chloride 0.9%. 1000 milliLiter(s) (10 mL/Hr) IV Continuous <Continuous>  vasopressin Infusion 0.033 Unit(s)/Min (2 mL/Hr) IV Continuous <Continuous>    MEDICATIONS  (PRN):  HYDROmorphone (10 MICROgram(s)/mL) + BUpivacaine 0.0625% in 0.9% Sodium Chloride PCEA Rescue Clinician  Bolus 5 milliLiter(s) Epidural every 15 minutes PRN for Pain Score greater than 6  naloxone Injectable 0.1 milliGRAM(s) IV Push every 3 minutes PRN For ANY of the following changes in patient status:  A. RR LESS THAN 10 breaths per minute, B. Oxygen saturation LESS THAN 90%, C. Sedation score of 6  ondansetron Injectable 4 milliGRAM(s) IV Push every 6 hours PRN Nausea      OBJECTIVE:    Assessment of Catheter Site:    [X] Epidural 	  [X] Dressing intact	[X] Site non-tender	[X] Site without erythema, discharge, edema  [X] Epidural tubing and connection checked	[X] Gross neurological exam within normal limits  [ ] Catheter removed – tip intact		[X] Afebrile	            [ ] Febrile: ___    PT/INR - ( 09 Jun 2019 11:26 )   PT: 12.3 sec;   INR: 1.08 ratio         PTT - ( 09 Jun 2019 11:26 )  PTT:30.0 sec                      10.7   24.3  )-----------( 194      ( 09 Jun 2019 11:08 )             30.7     Vital Signs Last 24 Hrs  T(C): 37.8 (06-09-19 @ 12:00), Max: 37.8 (06-09-19 @ 12:00)  T(F): 100 (06-09-19 @ 12:00), Max: 100 (06-09-19 @ 12:00)  HR: 99 (06-09-19 @ 12:00) (65 - 99)  BP: --  BP(mean): --  RR: 27 (06-09-19 @ 12:00) (0 - 66)  SpO2: 96% (06-09-19 @ 12:00) (86% - 100%)      Sedation Score:	[X] Alert	[ ] Drowsy	[ ] Arousable  [ ] Asleep     [ ] Unresponsive    Side Effects:	[X] None	[ ] Nausea	[ ] Vomiting   [ ] Pruritus  		[ ] Weakness     [ ] Numbness	[ ] Other:    ASSESSMENT/ PLAN:    Therapy:	[ ] Continue   [x] Discontinue   [ ] Change to PRN Analgesics   [ ] Change to PCA    Documentation and Verification of current medications:  [X] Done	[ ] Not done, not eligible, reason:

## 2019-06-09 NOTE — PROGRESS NOTE ADULT - ASSESSMENT
54F Hx asthma and extensive smoking history presents with metastatic renal cancer that extends into the IVC, now s/p R radical nephrectomy with IVC tumor thrombectomy, primary closure of IVC, cardiopulmonary bypass and arrest    PLAN:  - please wrap bilateral LE in ace wrap from feet to thighs  - appreciate CARDS, UROL, CTU care    VASCULAR SURGERY  [id number]

## 2019-06-09 NOTE — PROGRESS NOTE ADULT - ASSESSMENT
54 yr old female with H/O Asthma, Heavy smoking, recent Dx of Renal cell ca S/P Radical R Nephrectomy, Sternotomy with IVC mass removal, requiring CPB & circulatory arrest POD # 2  Intra op course complicated with bleeding requiring, 8 units PRBC transfusion,   Post op course with Hypotension, requiring pressors, volume resuscitation, invasive hemodynamic monitoring.   Acute Kidney Injury, febrile with rising WBCs suspected nosocomial infection.    Plan cont supplemental oxygen, Pain control, PCEA cath Dced   SR off pressors, S/P I PRBC Tx this am for anemia, f/u Hct stable  Pain control, Nicotine Patch  Febrile DC all OR lines, BC x2, UA & UC check Procalcitonin   no ABx at present will f/u CXR &  WBC      BUN/ Cr likely related to hemodynamics, avoid hypovolemia & hypotension    Resume Lovenox   Ambulate as tolerated     I have spent 30 minutes providing critical care management to this patient.

## 2019-06-10 DIAGNOSIS — Z90.5 ACQUIRED ABSENCE OF KIDNEY: ICD-10-CM

## 2019-06-10 LAB
ALBUMIN SERPL ELPH-MCNC: 3.1 G/DL — LOW (ref 3.3–5)
ALP SERPL-CCNC: 63 U/L — SIGNIFICANT CHANGE UP (ref 40–120)
ALT FLD-CCNC: 28 U/L — SIGNIFICANT CHANGE UP (ref 10–45)
ANION GAP SERPL CALC-SCNC: 10 MMOL/L — SIGNIFICANT CHANGE UP (ref 5–17)
APTT BLD: 28.3 SEC — SIGNIFICANT CHANGE UP (ref 27.5–36.3)
AST SERPL-CCNC: 37 U/L — SIGNIFICANT CHANGE UP (ref 10–40)
BILIRUB SERPL-MCNC: 0.8 MG/DL — SIGNIFICANT CHANGE UP (ref 0.2–1.2)
BUN SERPL-MCNC: 16 MG/DL — SIGNIFICANT CHANGE UP (ref 7–23)
CALCIUM SERPL-MCNC: 8.5 MG/DL — SIGNIFICANT CHANGE UP (ref 8.4–10.5)
CHLORIDE SERPL-SCNC: 105 MMOL/L — SIGNIFICANT CHANGE UP (ref 96–108)
CO2 SERPL-SCNC: 25 MMOL/L — SIGNIFICANT CHANGE UP (ref 22–31)
CREAT SERPL-MCNC: 1.28 MG/DL — SIGNIFICANT CHANGE UP (ref 0.5–1.3)
GLUCOSE BLDC GLUCOMTR-MCNC: 83 MG/DL — SIGNIFICANT CHANGE UP (ref 70–99)
GLUCOSE SERPL-MCNC: 88 MG/DL — SIGNIFICANT CHANGE UP (ref 70–99)
HCT VFR BLD CALC: 30.9 % — LOW (ref 34.5–45)
HGB BLD-MCNC: 10.4 G/DL — LOW (ref 11.5–15.5)
INR BLD: 1.08 RATIO — SIGNIFICANT CHANGE UP (ref 0.88–1.16)
MAGNESIUM SERPL-MCNC: 2 MG/DL — SIGNIFICANT CHANGE UP (ref 1.6–2.6)
MCHC RBC-ENTMCNC: 31.4 PG — SIGNIFICANT CHANGE UP (ref 27–34)
MCHC RBC-ENTMCNC: 33.7 GM/DL — SIGNIFICANT CHANGE UP (ref 32–36)
MCV RBC AUTO: 93.1 FL — SIGNIFICANT CHANGE UP (ref 80–100)
PHOSPHATE SERPL-MCNC: 2 MG/DL — LOW (ref 2.5–4.5)
PLATELET # BLD AUTO: 233 K/UL — SIGNIFICANT CHANGE UP (ref 150–400)
POTASSIUM SERPL-MCNC: 4 MMOL/L — SIGNIFICANT CHANGE UP (ref 3.5–5.3)
POTASSIUM SERPL-SCNC: 4 MMOL/L — SIGNIFICANT CHANGE UP (ref 3.5–5.3)
PROT SERPL-MCNC: 5.5 G/DL — LOW (ref 6–8.3)
PROTHROM AB SERPL-ACNC: 12.3 SEC — SIGNIFICANT CHANGE UP (ref 10–12.9)
RBC # BLD: 3.32 M/UL — LOW (ref 3.8–5.2)
RBC # FLD: 15 % — HIGH (ref 10.3–14.5)
SODIUM SERPL-SCNC: 140 MMOL/L — SIGNIFICANT CHANGE UP (ref 135–145)
WBC # BLD: 20.7 K/UL — HIGH (ref 3.8–10.5)
WBC # FLD AUTO: 20.7 K/UL — HIGH (ref 3.8–10.5)

## 2019-06-10 PROCEDURE — 71045 X-RAY EXAM CHEST 1 VIEW: CPT | Mod: 26

## 2019-06-10 PROCEDURE — 99024 POSTOP FOLLOW-UP VISIT: CPT

## 2019-06-10 RX ORDER — OXYCODONE AND ACETAMINOPHEN 5; 325 MG/1; MG/1
1 TABLET ORAL EVERY 6 HOURS
Refills: 0 | Status: DISCONTINUED | OUTPATIENT
Start: 2019-06-10 | End: 2019-06-12

## 2019-06-10 RX ORDER — SODIUM CHLORIDE 9 MG/ML
3 INJECTION INTRAMUSCULAR; INTRAVENOUS; SUBCUTANEOUS EVERY 8 HOURS
Refills: 0 | Status: DISCONTINUED | OUTPATIENT
Start: 2019-06-10 | End: 2019-06-12

## 2019-06-10 RX ORDER — FUROSEMIDE 40 MG
10 TABLET ORAL ONCE
Refills: 0 | Status: COMPLETED | OUTPATIENT
Start: 2019-06-10 | End: 2019-06-10

## 2019-06-10 RX ORDER — OXYCODONE AND ACETAMINOPHEN 5; 325 MG/1; MG/1
2 TABLET ORAL EVERY 6 HOURS
Refills: 0 | Status: DISCONTINUED | OUTPATIENT
Start: 2019-06-10 | End: 2019-06-12

## 2019-06-10 RX ORDER — CEFEPIME 1 G/1
INJECTION, POWDER, FOR SOLUTION INTRAMUSCULAR; INTRAVENOUS
Refills: 0 | Status: DISCONTINUED | OUTPATIENT
Start: 2019-06-10 | End: 2019-06-12

## 2019-06-10 RX ORDER — CEFEPIME 1 G/1
1000 INJECTION, POWDER, FOR SOLUTION INTRAMUSCULAR; INTRAVENOUS ONCE
Refills: 0 | Status: COMPLETED | OUTPATIENT
Start: 2019-06-10 | End: 2019-06-10

## 2019-06-10 RX ORDER — CEFEPIME 1 G/1
1000 INJECTION, POWDER, FOR SOLUTION INTRAMUSCULAR; INTRAVENOUS EVERY 12 HOURS
Refills: 0 | Status: DISCONTINUED | OUTPATIENT
Start: 2019-06-10 | End: 2019-06-12

## 2019-06-10 RX ADMIN — SODIUM CHLORIDE 3 MILLILITER(S): 9 INJECTION INTRAMUSCULAR; INTRAVENOUS; SUBCUTANEOUS at 21:30

## 2019-06-10 RX ADMIN — ENOXAPARIN SODIUM 40 MILLIGRAM(S): 100 INJECTION SUBCUTANEOUS at 11:48

## 2019-06-10 RX ADMIN — Medication 62.5 MILLIMOLE(S): at 03:54

## 2019-06-10 RX ADMIN — OXYCODONE AND ACETAMINOPHEN 2 TABLET(S): 5; 325 TABLET ORAL at 08:35

## 2019-06-10 RX ADMIN — Medication 100 MILLIGRAM(S): at 11:48

## 2019-06-10 RX ADMIN — Medication 100 MILLIGRAM(S): at 06:00

## 2019-06-10 RX ADMIN — PANTOPRAZOLE SODIUM 40 MILLIGRAM(S): 20 TABLET, DELAYED RELEASE ORAL at 06:00

## 2019-06-10 RX ADMIN — OXYCODONE AND ACETAMINOPHEN 2 TABLET(S): 5; 325 TABLET ORAL at 09:05

## 2019-06-10 RX ADMIN — Medication 100 MILLIGRAM(S): at 21:26

## 2019-06-10 RX ADMIN — CEFEPIME 100 MILLIGRAM(S): 1 INJECTION, POWDER, FOR SOLUTION INTRAMUSCULAR; INTRAVENOUS at 17:26

## 2019-06-10 RX ADMIN — Medication 81 MILLIGRAM(S): at 11:48

## 2019-06-10 RX ADMIN — SODIUM CHLORIDE 3 MILLILITER(S): 9 INJECTION INTRAMUSCULAR; INTRAVENOUS; SUBCUTANEOUS at 14:59

## 2019-06-10 RX ADMIN — Medication 10 MILLIGRAM(S): at 06:00

## 2019-06-10 RX ADMIN — CEFEPIME 100 MILLIGRAM(S): 1 INJECTION, POWDER, FOR SOLUTION INTRAMUSCULAR; INTRAVENOUS at 06:53

## 2019-06-10 NOTE — PROGRESS NOTE ADULT - SUBJECTIVE AND OBJECTIVE BOX
UROLOGY DAILY PROGRESS NOTE:     Subjective: Patient seen and examined at bedside. + low grade fever last night.  Pain controlled, tolerating clears, no BM or flatus as of yet.  Denies chest pain or SOB, dysuria or urgency. + ambulating       Objective:  Vital signs  T(F): , Max: 100.4 (06-09-19 @ 20:00)  HR: 99 (06-10-19 @ 08:00)  BP: 123/79 (06-10-19 @ 08:00)  SpO2: 100% (06-10-19 @ 08:00)  Wt(kg): --    I&O's Summary    09 Jun 2019 07:01  -  10 Tony 2019 07:00  --------------------------------------------------------  IN: 479.6 mL / OUT: 2650 mL / NET: -2170.4 mL    10 Tony 2019 07:01  -  10 Tony 2019 08:09  --------------------------------------------------------  IN: 0 mL / OUT: 250 mL / NET: -250 mL        Gen: NAD  Pulm: No respiratory distress, no subcostal retractions  CV: RRR, no JVD  Abd: Soft, NT, ND, dressings removed, incisions CDI  Back: No CVAT  Ext: No edema, no calf tenderness     Labs:  06-10  20.7  / 30.9  /1.28   06-09  24.3  / 30.7  /x                              10.4   20.7  )-----------( 233      ( 10 Tony 2019 01:07 )             30.9     06-10    140  |  105  |  16  ----------------------------<  88  4.0   |  25  |  1.28    Ca    8.5      10 Tony 2019 01:07  Phos  2.0     06-10  Mg     2.0     06-10    TPro  5.5<L>  /  Alb  3.1<L>  /  TBili  0.8  /  DBili  x   /  AST  37  /  ALT  28  /  AlkPhos  63  06-10    PT/INR - ( 10 Tony 2019 01:07 )   PT: 12.3 sec;   INR: 1.08 ratio         PTT - ( 10 Tony 2019 01:07 )  PTT:28.3 sec    Urinalysis (06.09.19 @ 21:54)    Glucose Qualitative, Urine: Negative    Blood, Urine: Small    pH Urine: 6.5    Color: Light Yellow    Urine Appearance: Slightly Turbid    Bilirubin: Negative    Ketone - Urine: Negative    Specific Gravity: 1.011    Protein, Urine: Trace    Urobilinogen: Negative    Nitrite: Negative    Leukocyte Esterase Concentration: Negative

## 2019-06-10 NOTE — PROGRESS NOTE ADULT - ASSESSMENT
A/P: 54y Female s/p combined surgery June 7: R radical nephrectomy with IVC tumor thrombectomy, primary closure of IVC, cardiopulmonary bypass and circulatory arrest  - oob/ambulate  - awaiting GI fxn  - fever workup, please obtain urine culture  - f/u CXR read   - DVT prophylaxis  - f/u blood cultures

## 2019-06-10 NOTE — PROGRESS NOTE ADULT - SUBJECTIVE AND OBJECTIVE BOX
VITAL SIGNS    Subjective: "I'm feeling ok." Denies CP, palpitation, SOB, PAN, HA, dizziness, N/V/D, fever or chills.  No acute event noted overnight.     Telemetry: ST 's      Vital Signs Last 24 Hrs  T(C): 36.7 (06-10-19 @ 14:10), Max: 38 (06-09-19 @ 20:00)  T(F): 98.1 (06-10-19 @ 14:10), Max: 100.4 (06-09-19 @ 20:00)  HR: 106 (06-10-19 @ 14:10) (93 - 113)  BP: 115/82 (06-10-19 @ 14:10) (101/63 - 127/78)  RR: 20 (06-10-19 @ 14:10) (14 - 38)  SpO2: 98% (06-10-19 @ 14:10) (96% - 100%)           06-09 @ 07:01  -  06-10 @ 07:00  --------------------------------------------------------  IN: 479.6 mL / OUT: 2650 mL / NET: -2170.4 mL    06-10 @ 07:01  -  06-10 @ 16:51  --------------------------------------------------------  IN: 60 mL / OUT: 850 mL / NET: -790 mL    CAPILLARY BLOOD GLUCOSE    POCT Blood Glucose.: 83 mg/dL (10 Tony 2019 08:35)  POCT Blood Glucose.: 87 mg/dL (09 Jun 2019 21:18)        PHYSICAL EXAM    Neurology:  (+) [place]; alert and oriented x 3, nonfocal, no gross deficits    CV: (+) S1 and S2, No murmurs, rubs, gallops or clicks     Sternal Wound:  MSI -->CDI , sternum stable    Lungs: CTA B/L     Abdomen: soft, nontender, nondistended, positive bowel sounds, (+) Flatus; (+) BM, Mid abdominal incision with SS --> C/D/I      :  Voiding               Extremities:  B/L LE (+) trace edema; negative calf tenderness; (+) 2 DP palpable        aspirin enteric coated 81 milliGRAM(s) Oral daily  cefepime IVPB 1000 milliGRAM(s) IV Intermittent every 12 hours  docusate sodium 100 milliGRAM(s) Oral three times a day  enoxaparin Injectable 40 milliGRAM(s) SubCutaneous daily  oxyCODONE 5 mG/acetaminophen 325 mG 1 Tablet(s) Oral every 6 hours PRN  oxyCODONE 5 mG/acetaminophen 325 mG 2 Tablet(s) Oral every 6 hours PRN  pantoprazole Tablet 40 milliGRAM(s) Oral before breakfast    Physical Therapy Rec:   Home  [  ]   Home w/ PT  [ X ]  Rehab  [  ]    Discussed with Cardiothoracic Team at AM rounds.

## 2019-06-10 NOTE — PROGRESS NOTE ADULT - ASSESSMENT
54 yr old female with H/O Asthma, Heavy smoking, recent Dx of Renal cell ca S/P Radical R Nephrectomy, Sternotomy with IVC mass removal, requiring CPB & circulatory arrest POD # 2  Intra op course complicated with bleeding requiring, 8 units PRBC transfusion,   Post op course with Hypotension, requiring pressors, volume resuscitation, invasive hemodynamic monitoring.   Acute Kidney Injury, febrile with rising WBCs suspected nosocomial infection.    Plan cont supplemental oxygen, Pain control, PCEA cath Dced   SR off pressors, S/P I PRBC Tx this am for anemia, f/u Hct stable  Pain control, Nicotine Patch  Febrile DC all OR lines, BC x2, UA & UC check Procalcitonin   no ABx at present will f/u CXR &  WBC      Ambulate as tolerated     I have spent 30 minutes providing critical care management to this patient.

## 2019-06-10 NOTE — PROGRESS NOTE ADULT - ASSESSMENT
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 underlying nosocomial infection , on anbx now   consider ID eval   fu with   cTICU care appreciated

## 2019-06-10 NOTE — PROGRESS NOTE ADULT - ASSESSMENT
53 yo female with PMHx of Asthma, active Heavy smoking, recent Dx of Renal cell CA S/P 6/7/19 Radical R Nephrectomy, Sternotomy with IVC thrombectomy, requiring CPB & circulatory arrest POD #3  Intra op course complicated with bleeding requiring, 8 units PRBC transfusion, 2 units cryo. 2 platelets and 4 units FFP.   Post op course   Hypotension, requiring pressor support, volume resuscitation and invasive hemodynamic monitoring.   Acute Kidney Injury à Resolving   Extubated POD #1  6/10 febrile with rising WBCs à PAN Culture; BC x2, UA & UC check Procalcitonin. S/P 1 unit PRBC transfused this am for anemia, f/u Hct stable.  Transferred to 2 [facility] telemetry floor.   Disposition: Home PT once medically cleared

## 2019-06-10 NOTE — PROGRESS NOTE ADULT - SUBJECTIVE AND OBJECTIVE BOX
Remained critically ill on continuos ICU monitoring.     OBJECTIVE:  ICU Vital Signs Last 24 Hrs  T(C): 36.8 (10 Tony 2019 08:00), Max: 38 (2019 20:00)  T(F): 98.3 (10 Tony 2019 08:00), Max: 100.4 (2019 20:00)  HR: 96 (10 Tony 2019 10:) (96 - 113)  BP: 111/72 (10 Tony 2019 10:) (103/66 - 127/78)  BP(mean): 82 (10 Tony 2019 10:) (77 - 96)  ABP: 130/79 (2019 23:00) (100/53 - 130/79)  ABP(mean): 84 (2019 23:00) (67 - 89)  RR: 14 (10 Tony 2019 10:) (14 - 38)  SpO2: 99% (10 Tony 2019 10:) (94% - 100%)    I&O's Detail    2019 07:01  -  10 Tony 2019 07:00  --------------------------------------------------------  IN:    IV PiggyBack: 299.6 mL    sodium chloride 0.9%: 180 mL  Total IN: 479.6 mL    OUT:    Chest Tube: 10 mL    Indwelling Catheter - Urethral: 240 mL    Voided: 2400 mL  Total OUT: 2650 mL    Total NET: -2170.4 mL      10 Tony 2019 07:01  -  10 Tony 2019 11:12  --------------------------------------------------------  IN:    Oral Fluid: 60 mL  Total IN: 60 mL    OUT:    Voided: 600 mL  Total OUT: 600 mL    Total NET: -540 mL    PHYSICAL EXAM:     Daily Weight in k.2 (2019 00:00)  General: in bed on cont ICU monitoring   Neurology: A&Ox3, nonfocal, HUMMEL x 4  Eyes: PERRLA/ EOMI, Gross vision intact  ENT/Neck: Neck supple, trachea midline, No JVD, Gross hearing intact  Respiratory:  B/L, fine  rales, + sternal dressing   CV: RRR, S1S2, no murmurs, rubs or gallops  Abdominal: Soft, NT, ND +BS, + incision   Extremities: No edema, + peripheral pulses  Skin: No Rashes, Hematoma, Ecchymosis   MEDICATIONS  (STANDING):  aspirin enteric coated 81 milliGRAM(s) Oral daily  cefepime   IVPB      cefepime   IVPB 1000 milliGRAM(s) IV Intermittent every 12 hours  docusate sodium 100 milliGRAM(s) Oral three times a day  enoxaparin Injectable 40 milliGRAM(s) SubCutaneous daily  insulin lispro (HumaLOG) corrective regimen sliding scale   SubCutaneous three times a day before meals  insulin lispro (HumaLOG) corrective regimen sliding scale   SubCutaneous at bedtime  pantoprazole    Tablet 40 milliGRAM(s) Oral before breakfast    MEDICATIONS  (PRN):  oxyCODONE    5 mG/acetaminophen 325 mG 1 Tablet(s) Oral every 6 hours PRN Moderate Pain (4 - 6)  oxyCODONE    5 mG/acetaminophen 325 mG 2 Tablet(s) Oral every 6 hours PRN Severe Pain (7 - 10)    LABS:                        10.4   20.7  )-----------( 233      ( 10 Tony 2019 01:07 )             30.9   06-10    140  |  105  |  16  ----------------------------<  88  4.0   |  25  |  1.28    Ca    8.5      10 Tony 2019 01:07  Phos  2.0     06-10  Mg     2.0     06-10    TPro  5.5<L>  /  Alb  3.1<L>  /  TBili  0.8  /  DBili  x   /  AST  37  /  ALT  28  /  AlkPhos  63  06-10           MICROBIOLOGY:     RADIOLOGY:  X Reviewed and interpreted by me

## 2019-06-10 NOTE — PROGRESS NOTE ADULT - SUBJECTIVE AND OBJECTIVE BOX
Subjective: Patient seen and examined. No new events except as noted.     SUBJECTIVE/ROS:  no cp or sob       MEDICATIONS:  MEDICATIONS  (STANDING):  aspirin enteric coated 81 milliGRAM(s) Oral daily  cefepime   IVPB 1000 milliGRAM(s) IV Intermittent every 12 hours  cefepime   IVPB      docusate sodium 100 milliGRAM(s) Oral three times a day  enoxaparin Injectable 40 milliGRAM(s) SubCutaneous daily  insulin lispro (HumaLOG) corrective regimen sliding scale   SubCutaneous three times a day before meals  insulin lispro (HumaLOG) corrective regimen sliding scale   SubCutaneous at bedtime  pantoprazole    Tablet 40 milliGRAM(s) Oral before breakfast      PHYSICAL EXAM:  T(C): 36.8 (06-10-19 @ 08:00), Max: 38 (06-09-19 @ 20:00)  HR: 99 (06-10-19 @ 08:00) (87 - 113)  BP: 123/79 (06-10-19 @ 08:00) (113/76 - 127/78)  RR: 22 (06-10-19 @ 08:00) (6 - 38)  SpO2: 100% (06-10-19 @ 08:00) (94% - 100%)  Wt(kg): --  I&O's Summary    09 Jun 2019 07:01  -  10 Tony 2019 07:00  --------------------------------------------------------  IN: 479.6 mL / OUT: 2650 mL / NET: -2170.4 mL    10 Tony 2019 07:01  -  10 Tony 2019 08:32  --------------------------------------------------------  IN: 0 mL / OUT: 250 mL / NET: -250 mL            JVP: Normal  Neck: supple  Lung: clear   CV: S1 S2 , Murmur:  Abd: soft  Ext: No edema  neuro: Awake / alert  Psych: flat affect  Skin: normal``    LABS/DATA:    CARDIAC MARKERS:  CARDIAC MARKERS ( 07 Jun 2019 18:45 )  x     / x     / 491 U/L / x     / 42.5 ng/mL                                10.4   20.7  )-----------( 233      ( 10 Tony 2019 01:07 )             30.9     06-10    140  |  105  |  16  ----------------------------<  88  4.0   |  25  |  1.28    Ca    8.5      10 Tony 2019 01:07  Phos  2.0     06-10  Mg     2.0     06-10    TPro  5.5<L>  /  Alb  3.1<L>  /  TBili  0.8  /  DBili  x   /  AST  37  /  ALT  28  /  AlkPhos  63  06-10    proBNP:   Lipid Profile:   HgA1c:   TSH: Thyroid Stimulating Hormone, Serum: 2.95 uIU/mL (06-09 @ 09:34)      TELE:  EKG:

## 2019-06-11 LAB
ANION GAP SERPL CALC-SCNC: 13 MMOL/L — SIGNIFICANT CHANGE UP (ref 5–17)
BASOPHILS # BLD AUTO: 0.28 K/UL — HIGH (ref 0–0.2)
BASOPHILS NFR BLD AUTO: 1.7 % — SIGNIFICANT CHANGE UP (ref 0–2)
BUN SERPL-MCNC: 11 MG/DL — SIGNIFICANT CHANGE UP (ref 7–23)
CALCIUM SERPL-MCNC: 8.7 MG/DL — SIGNIFICANT CHANGE UP (ref 8.4–10.5)
CHLORIDE SERPL-SCNC: 104 MMOL/L — SIGNIFICANT CHANGE UP (ref 96–108)
CO2 SERPL-SCNC: 23 MMOL/L — SIGNIFICANT CHANGE UP (ref 22–31)
CREAT SERPL-MCNC: 1.13 MG/DL — SIGNIFICANT CHANGE UP (ref 0.5–1.3)
CULTURE RESULTS: NO GROWTH — SIGNIFICANT CHANGE UP
EOSINOPHIL # BLD AUTO: 0.28 K/UL — SIGNIFICANT CHANGE UP (ref 0–0.5)
EOSINOPHIL NFR BLD AUTO: 1.7 % — SIGNIFICANT CHANGE UP (ref 0–6)
GLUCOSE SERPL-MCNC: 84 MG/DL — SIGNIFICANT CHANGE UP (ref 70–99)
HCT VFR BLD CALC: 30.8 % — LOW (ref 34.5–45)
HGB BLD-MCNC: 9.8 G/DL — LOW (ref 11.5–15.5)
LYMPHOCYTES # BLD AUTO: 18.1 % — SIGNIFICANT CHANGE UP (ref 13–44)
LYMPHOCYTES # BLD AUTO: 3 K/UL — SIGNIFICANT CHANGE UP (ref 1–3.3)
MCHC RBC-ENTMCNC: 30.1 PG — SIGNIFICANT CHANGE UP (ref 27–34)
MCHC RBC-ENTMCNC: 31.8 GM/DL — LOW (ref 32–36)
MCV RBC AUTO: 94.5 FL — SIGNIFICANT CHANGE UP (ref 80–100)
MONOCYTES # BLD AUTO: 0.58 K/UL — SIGNIFICANT CHANGE UP (ref 0–0.9)
MONOCYTES NFR BLD AUTO: 3.5 % — SIGNIFICANT CHANGE UP (ref 2–14)
NEUTROPHILS # BLD AUTO: 12.41 K/UL — HIGH (ref 1.8–7.4)
NEUTROPHILS NFR BLD AUTO: 75 % — SIGNIFICANT CHANGE UP (ref 43–77)
PLATELET # BLD AUTO: 326 K/UL — SIGNIFICANT CHANGE UP (ref 150–400)
POTASSIUM SERPL-MCNC: 3.7 MMOL/L — SIGNIFICANT CHANGE UP (ref 3.5–5.3)
POTASSIUM SERPL-SCNC: 3.7 MMOL/L — SIGNIFICANT CHANGE UP (ref 3.5–5.3)
RBC # BLD: 3.26 M/UL — LOW (ref 3.8–5.2)
RBC # FLD: 15.5 % — HIGH (ref 10.3–14.5)
SODIUM SERPL-SCNC: 140 MMOL/L — SIGNIFICANT CHANGE UP (ref 135–145)
SPECIMEN SOURCE: SIGNIFICANT CHANGE UP
WBC # BLD: 16.55 K/UL — HIGH (ref 3.8–10.5)
WBC # FLD AUTO: 16.55 K/UL — HIGH (ref 3.8–10.5)

## 2019-06-11 PROCEDURE — 93010 ELECTROCARDIOGRAM REPORT: CPT

## 2019-06-11 PROCEDURE — 71045 X-RAY EXAM CHEST 1 VIEW: CPT | Mod: 26

## 2019-06-11 RX ORDER — SORBITOL SOLUTION 70 %
30 SOLUTION, ORAL MISCELLANEOUS ONCE
Refills: 0 | Status: COMPLETED | OUTPATIENT
Start: 2019-06-11 | End: 2019-06-11

## 2019-06-11 RX ORDER — POTASSIUM CHLORIDE 20 MEQ
20 PACKET (EA) ORAL ONCE
Refills: 0 | Status: COMPLETED | OUTPATIENT
Start: 2019-06-11 | End: 2019-06-11

## 2019-06-11 RX ADMIN — Medication 81 MILLIGRAM(S): at 11:00

## 2019-06-11 RX ADMIN — ENOXAPARIN SODIUM 40 MILLIGRAM(S): 100 INJECTION SUBCUTANEOUS at 11:00

## 2019-06-11 RX ADMIN — PANTOPRAZOLE SODIUM 40 MILLIGRAM(S): 20 TABLET, DELAYED RELEASE ORAL at 05:36

## 2019-06-11 RX ADMIN — Medication 20 MILLIEQUIVALENT(S): at 14:51

## 2019-06-11 RX ADMIN — OXYCODONE AND ACETAMINOPHEN 1 TABLET(S): 5; 325 TABLET ORAL at 11:00

## 2019-06-11 RX ADMIN — Medication 100 MILLIGRAM(S): at 13:22

## 2019-06-11 RX ADMIN — SODIUM CHLORIDE 3 MILLILITER(S): 9 INJECTION INTRAMUSCULAR; INTRAVENOUS; SUBCUTANEOUS at 05:37

## 2019-06-11 RX ADMIN — SODIUM CHLORIDE 3 MILLILITER(S): 9 INJECTION INTRAMUSCULAR; INTRAVENOUS; SUBCUTANEOUS at 21:01

## 2019-06-11 RX ADMIN — Medication 100 MILLIGRAM(S): at 05:36

## 2019-06-11 RX ADMIN — CEFEPIME 100 MILLIGRAM(S): 1 INJECTION, POWDER, FOR SOLUTION INTRAMUSCULAR; INTRAVENOUS at 18:00

## 2019-06-11 RX ADMIN — CEFEPIME 100 MILLIGRAM(S): 1 INJECTION, POWDER, FOR SOLUTION INTRAMUSCULAR; INTRAVENOUS at 05:35

## 2019-06-11 RX ADMIN — SODIUM CHLORIDE 3 MILLILITER(S): 9 INJECTION INTRAMUSCULAR; INTRAVENOUS; SUBCUTANEOUS at 13:22

## 2019-06-11 RX ADMIN — OXYCODONE AND ACETAMINOPHEN 1 TABLET(S): 5; 325 TABLET ORAL at 11:45

## 2019-06-11 RX ADMIN — Medication 30 MILLILITER(S): at 11:00

## 2019-06-11 NOTE — PROGRESS NOTE ADULT - SUBJECTIVE AND OBJECTIVE BOX
Subjective: Patient seen and examined. No new events except as noted.     SUBJECTIVE/ROS:  feels well  No chest pain, dyspnea, palpitation, or dizziness.       MEDICATIONS:  MEDICATIONS  (STANDING):  aspirin enteric coated 81 milliGRAM(s) Oral daily  cefepime   IVPB      cefepime   IVPB 1000 milliGRAM(s) IV Intermittent every 12 hours  docusate sodium 100 milliGRAM(s) Oral three times a day  enoxaparin Injectable 40 milliGRAM(s) SubCutaneous daily  pantoprazole    Tablet 40 milliGRAM(s) Oral before breakfast  sodium chloride 0.9% lock flush 3 milliLiter(s) IV Push every 8 hours      PHYSICAL EXAM:  T(C): 37.3 (06-11-19 @ 05:00), Max: 37.8 (06-10-19 @ 20:15)  HR: 110 (06-11-19 @ 05:00) (93 - 122)  BP: 102/69 (06-11-19 @ 05:00) (101/63 - 115/82)  RR: 18 (06-11-19 @ 05:00) (14 - 34)  SpO2: 95% (06-11-19 @ 05:00) (95% - 100%)  Wt(kg): --  I&O's Summary    10 Tony 2019 07:01  -  11 Jun 2019 07:00  --------------------------------------------------------  IN: 60 mL / OUT: 1100 mL / NET: -1040 mL            JVP: Normal  Neck: supple  Lung: clear   CV: S1 S2 , Murmur:  Abd: soft  Ext: No edema  neuro: Awake / alert  Psych: flat affect  Skin: normal``    LABS/DATA:    CARDIAC MARKERS:                                10.4   20.7  )-----------( 233      ( 10 Tony 2019 01:07 )             30.9     06-11    140  |  104  |  11  ----------------------------<  84  3.7   |  23  |  1.13    Ca    8.7      11 Jun 2019 06:42  Phos  2.0     06-10  Mg     2.0     06-10    TPro  5.5<L>  /  Alb  3.1<L>  /  TBili  0.8  /  DBili  x   /  AST  37  /  ALT  28  /  AlkPhos  63  06-10    proBNP:   Lipid Profile:   HgA1c:   TSH:     TELE:  EKG:

## 2019-06-11 NOTE — PROGRESS NOTE ADULT - SUBJECTIVE AND OBJECTIVE BOX
SUBJECTIVE: No events overnight    Vital Signs Last 24 Hrs  T(C): 37.3 (2019 05:00), Max: 37.8 (10 Tony 2019 20:15)  T(F): 99.1 (2019 05:00), Max: 100 (10 Tony 2019 20:15)  HR: 110 (2019 05:00) (93 - 122)  BP: 102/69 (2019 05:00) (101/63 - 115/82)  BP(mean): 90 (10 Tony 2019 13:00) (75 - 90)  RR: 18 (2019 05:00) (18 - 34)  SpO2: 95% (2019 05:00) (95% - 100%)    I&O's Detail    10 Tony 2019 07:01  -  2019 07:00  --------------------------------------------------------  IN:    Oral Fluid: 60 mL  Total IN: 60 mL    OUT:    Voided: 1100 mL  Total OUT: 1100 mL    Total NET: -1040 mL          Physical Exam:  General Appearance: Appears well, NAD  Abdomen: Soft, nontender, appropriate incisional tenderness, dressings clean and dry and intact  Extremities: Grossly symmetric, no edema    LABS:                        9.8    16.55 )-----------( 326      ( 2019 08:51 )             30.8     06-11    140  |  104  |  11  ----------------------------<  84  3.7   |  23  |  1.13    Ca    8.7      2019 06:42  Phos  2.0     06-10  Mg     2.0     06-10    TPro  5.5<L>  /  Alb  3.1<L>  /  TBili  0.8  /  DBili  x   /  AST  37  /  ALT  28  /  AlkPhos  63  06-10    PT/INR - ( 10 Tony 2019 01:07 )   PT: 12.3 sec;   INR: 1.08 ratio         PTT - ( 10 Tony 2019 01:07 )  PTT:28.3 sec  Urinalysis Basic - ( 2019 21:54 )    Color: Light Yellow / Appearance: Slightly Turbid / S.011 / pH: x  Gluc: x / Ketone: Negative  / Bili: Negative / Urobili: Negative   Blood: x / Protein: Trace / Nitrite: Negative   Leuk Esterase: Negative / RBC: 4 /hpf / WBC 5 /HPF   Sq Epi: x / Non Sq Epi: 14 /hpf / Bacteria: Negative        RADIOLOGY & ADDITIONAL STUDIES:

## 2019-06-11 NOTE — PROGRESS NOTE ADULT - ASSESSMENT
A/P: 54y Female s/p combined surgery June 7: R radical nephrectomy with IVC tumor thrombectomy, primary closure of IVC, cardiopulmonary bypass and circulatory arrest  - oob/ambulate  - am labs  - CXR  - f/u cultures  - cont abx  - nutrition  - vascular re: dispo planning

## 2019-06-11 NOTE — PROGRESS NOTE ADULT - PROBLEM SELECTOR PLAN 1
OR in am  NPO at midnight   Hibiclens in am   Peridex in am  PRBC ERICA  for OR
S/P 6/7/19 Radical R Nephrectomy, Sternotomy with IVC thrombectomy, requiring CPB & circulatory arrest   Continue with ASA 81 mg PO Daily.   Continue with Statin  Continue with Cefepime 1000mg IV BID x 5 days  Pain management   Awaiting cultures   Increase activity as tolerated.   Encourage Chest PT / Pulmonary toileting and Incentive spirometry every 1 hour x 10 while awake.   Continue with PUD and DVT prophylaxis.   Shower on POD #5.
S/P 6/7/19 Radical R Nephrectomy, Sternotomy with IVC thrombectomy, requiring CPB & circulatory arrest   Continue with ASA 81 mg PO Daily.   Continue with Statin  Continue with Cefepime 1000mg IV BID x 5 days  Pain management   Awaiting cultures   Increase activity as tolerated.   Encourage Chest PT / Pulmonary toileting and Incentive spirometry every 1 hour x 10 while awake.   Continue with PUD and DVT prophylaxis.   Shower on POD #5.  D/C plan home PT once medically cleared.   Plan of care discussed with attending

## 2019-06-11 NOTE — PROGRESS NOTE ADULT - ASSESSMENT
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

## 2019-06-11 NOTE — PROGRESS NOTE ADULT - ASSESSMENT
54F Hx asthma and extensive smoking history presents with metastatic renal cancer that extends into the IVC, now s/p R radical nephrectomy with IVC tumor thrombectomy, primary closure of IVC, cardiopulmonary bypass and arrest  - doing well, no longer need to wrap legs with ACE  - appreciate CT and Uro care    BALTAZAR Roldan PGY 3   Vascular Surgery

## 2019-06-11 NOTE — PROGRESS NOTE ADULT - ASSESSMENT
55 yo female with PMHx of Asthma, active Heavy smoking, recent Dx of Renal cell CA S/P 6/7/19 Radical R Nephrectomy, Sternotomy with IVC thrombectomy, requiring CPB & circulatory arrest POD #3  Intra op course complicated with bleeding requiring, 8 units PRBC transfusion, 2 units cryo. 2 platelets and 4 units FFP.   Post op course   Hypotension, requiring pressor support, volume resuscitation and invasive hemodynamic monitoring.   Acute Kidney Injury à Resolving   Extubated POD #1  6/10 febrile with rising WBCs à PAN Culture; BC x2, UA & UC . S/P 1 unit PRBC transfused this am for anemia, f/u Hct stable.  Transferred to 2 [facility] telemetry floor.   Disposition: Home PT once medically cleared   6/11 HD stable afebrile today.  BC no growth to date.  Urine culture pnd.  UA neg.

## 2019-06-11 NOTE — PROGRESS NOTE ADULT - SUBJECTIVE AND OBJECTIVE BOX
S: no complaints.  sitting in chair.  daughter a with pt.    Telemetry:        Vital Signs Last 24 Hrs  T(C): 36.8 (19 @ 12:00), Max: 37.8 (06-10-19 @ 20:15)  T(F): 98.2 (19 @ 12:00), Max: 100 (06-10-19 @ 20:15)  HR: 109 (19 @ 12:00) (108 - 122)  BP: 101/72 (19 @ 12:00) (101/72 - 113/79)  RR: 19 (19 @ 12:00) (18 - 19)  SpO2: 98% (19 @ 12:00) (95% - 98%)                   06-10 @ 07:01  -   @ 07:00  --------------------------------------------------------  IN: 60 mL / OUT: 1100 mL / NET: -1040 mL     @ 07:01  -   @ 14:19  --------------------------------------------------------  IN: 820 mL / OUT: 400 mL / NET: 420 mL      Daily Weight in k.1 (2019 08:00)              Drains:     MS         [  ] Drainage:                 L Pleural  [  ]  Drainage:                R Pleural  [  ]  Drainage:    Pacing Wires        [  ]   Settings:                                  Isolated  [  ]    Coumadin    [ ] YES          [ x ]      NO         Reason:                                 9.8    16.55 )-----------( 326      ( 2019 08:51 )             30.8       06-11    140  |  104  |  11  ----------------------------<  84  3.7   |  23  |  1.13    Ca    8.7      2019 06:42  Phos  2.0     06-10  Mg     2.0     06-10    TPro  5.5<L>  /  Alb  3.1<L>  /  TBili  0.8  /  DBili  x   /  AST  37  /  ALT  28  /  AlkPhos  63  06-10      PT/INR - ( 10 Tony 2019 01:07 )   PT: 12.3 sec;   INR: 1.08 ratio         PTT - ( 10 Tony 2019 01:07 )  PTT:28.3 sec    PHYSICAL EXAM:    Neurology: alert and oriented x 3, nonfocal, no gross deficits    CV :  S1S2 heard RR    Sternal Wound :  CDI , Stable    Lungs:  clear to ausc    Abdomen: soft, nontender, nondistended, positive bowel sounds, inc steris in place             Extremities:    no edema           aspirin enteric coated 81 milliGRAM(s) Oral daily  cefepime   IVPB      cefepime   IVPB 1000 milliGRAM(s) IV Intermittent every 12 hours  docusate sodium 100 milliGRAM(s) Oral three times a day  enoxaparin Injectable 40 milliGRAM(s) SubCutaneous daily  oxyCODONE    5 mG/acetaminophen 325 mG 1 Tablet(s) Oral every 6 hours PRN  oxyCODONE    5 mG/acetaminophen 325 mG 2 Tablet(s) Oral every 6 hours PRN  pantoprazole    Tablet 40 milliGRAM(s) Oral before breakfast  sodium chloride 0.9% lock flush 3 milliLiter(s) IV Push every 8 hours                              Physical Therapy Rec:   Home  [ x ]   Home w/ PT  [  ]  Rehab  [  ]    Discussed with Cardiothoracic Team at AM rounds.

## 2019-06-11 NOTE — PROGRESS NOTE ADULT - SUBJECTIVE AND OBJECTIVE BOX
UROLOGY PA PROGRESS NOTE:     Subjective:  no acute events overnight. pt denies any abdominal pain, only complaining of cough. passing flatus, no BM. tolerating diet. was out of bed yesterday to chair most of day.     Objective:  Vital signs  T(F): , Max: 100 (06-10-19 @ 20:15)  HR: 110 (19 @ 05:00)  BP: 102/69 (19 @ 05:00)  SpO2: 95% (19 @ 05:00)  Wt(kg): --    Output      @ 07:01  -  06-10 @ 07:00  --------------------------------------------------------  IN: 479.6 mL / OUT: 2650 mL / NET: -2170.4 mL    06-10 @ 07:01  -   @ 06:51  --------------------------------------------------------  IN: 60 mL / OUT: 1100 mL / NET: -1040 mL        Physical Exam:  Gen: NAD  Abd: soft, appropriate TTP around incisional site. incisions CDI, non distended  : voiding     Labs:  06-10  20.7  / 30.9  /1.28   06  24.3  / 30.7  /x                              10.4   20.7  )-----------( 233      ( 10 Tony 2019 01:07 )             30.9     06-10    140  |  105  |  16  ----------------------------<  88  4.0   |  25  |  1.28    Ca    8.5      10 Tony 2019 01:07  Phos  2.0     06-10  Mg     2.0     06-10    TPro  5.5<L>  /  Alb  3.1<L>  /  TBili  0.8  /  DBili  x   /  AST  37  /  ALT  28  /  AlkPhos  63  06-10    PT/INR - ( 10 Tony 2019 01:07 )   PT: 12.3 sec;   INR: 1.08 ratio         PTT - ( 10 Tony 2019 01:07 )  PTT:28.3 sec  Urinalysis Basic - ( 2019 21:54 )    Color: Light Yellow / Appearance: Slightly Turbid / S.011 / pH: x  Gluc: x / Ketone: Negative  / Bili: Negative / Urobili: Negative   Blood: x / Protein: Trace / Nitrite: Negative   Leuk Esterase: Negative / RBC: 4 /hpf / WBC 5 /HPF   Sq Epi: x / Non Sq Epi: 14 /hpf / Bacteria: Negative        Urine Cx:    Culture - Blood (collected 10 Tony 2019 00:17)  Source: .Blood  Preliminary Report (2019 01:01):    No growth to date.    Culture - Blood (collected 10 Tony 2019 00:17)  Source: .Blood  Preliminary Report (2019 01:01):    No growth to date.

## 2019-06-12 ENCOUNTER — TRANSCRIPTION ENCOUNTER (OUTPATIENT)
Age: 54
End: 2019-06-12

## 2019-06-12 ENCOUNTER — APPOINTMENT (OUTPATIENT)
Dept: UROLOGY | Facility: CLINIC | Age: 54
End: 2019-06-12

## 2019-06-12 VITALS — WEIGHT: 141.76 LBS

## 2019-06-12 LAB
ANION GAP SERPL CALC-SCNC: 10 MMOL/L — SIGNIFICANT CHANGE UP (ref 5–17)
BUN SERPL-MCNC: 8 MG/DL — SIGNIFICANT CHANGE UP (ref 7–23)
CALCIUM SERPL-MCNC: 9 MG/DL — SIGNIFICANT CHANGE UP (ref 8.4–10.5)
CHLORIDE SERPL-SCNC: 107 MMOL/L — SIGNIFICANT CHANGE UP (ref 96–108)
CO2 SERPL-SCNC: 24 MMOL/L — SIGNIFICANT CHANGE UP (ref 22–31)
CREAT SERPL-MCNC: 1.08 MG/DL — SIGNIFICANT CHANGE UP (ref 0.5–1.3)
GLUCOSE SERPL-MCNC: 91 MG/DL — SIGNIFICANT CHANGE UP (ref 70–99)
HCT VFR BLD CALC: 30.8 % — LOW (ref 34.5–45)
HGB BLD-MCNC: 10 G/DL — LOW (ref 11.5–15.5)
MCHC RBC-ENTMCNC: 30.6 PG — SIGNIFICANT CHANGE UP (ref 27–34)
MCHC RBC-ENTMCNC: 32.4 GM/DL — SIGNIFICANT CHANGE UP (ref 32–36)
MCV RBC AUTO: 94.5 FL — SIGNIFICANT CHANGE UP (ref 80–100)
PLATELET # BLD AUTO: 422 K/UL — HIGH (ref 150–400)
POTASSIUM SERPL-MCNC: 4.3 MMOL/L — SIGNIFICANT CHANGE UP (ref 3.5–5.3)
POTASSIUM SERPL-SCNC: 4.3 MMOL/L — SIGNIFICANT CHANGE UP (ref 3.5–5.3)
RBC # BLD: 3.26 M/UL — LOW (ref 3.8–5.2)
RBC # FLD: 14.1 % — SIGNIFICANT CHANGE UP (ref 10.3–14.5)
SODIUM SERPL-SCNC: 141 MMOL/L — SIGNIFICANT CHANGE UP (ref 135–145)
WBC # BLD: 12.5 K/UL — HIGH (ref 3.8–10.5)
WBC # FLD AUTO: 12.5 K/UL — HIGH (ref 3.8–10.5)

## 2019-06-12 PROCEDURE — 99232 SBSQ HOSP IP/OBS MODERATE 35: CPT | Mod: 24

## 2019-06-12 RX ORDER — ONDANSETRON 8 MG/1
1 TABLET, FILM COATED ORAL
Qty: 21 | Refills: 0
Start: 2019-06-12 | End: 2019-06-18

## 2019-06-12 RX ORDER — DOCUSATE SODIUM 100 MG
1 CAPSULE ORAL
Qty: 90 | Refills: 0
Start: 2019-06-12 | End: 2019-07-11

## 2019-06-12 RX ORDER — ACETAMINOPHEN 500 MG
2 TABLET ORAL
Qty: 240 | Refills: 0
Start: 2019-06-12 | End: 2019-07-11

## 2019-06-12 RX ORDER — PANTOPRAZOLE SODIUM 20 MG/1
1 TABLET, DELAYED RELEASE ORAL
Qty: 30 | Refills: 0
Start: 2019-06-12 | End: 2019-07-11

## 2019-06-12 RX ORDER — ASPIRIN/CALCIUM CARB/MAGNESIUM 324 MG
1 TABLET ORAL
Qty: 30 | Refills: 0
Start: 2019-06-12 | End: 2019-07-11

## 2019-06-12 RX ORDER — SENNA PLUS 8.6 MG/1
2 TABLET ORAL
Qty: 60 | Refills: 0
Start: 2019-06-12 | End: 2019-07-11

## 2019-06-12 RX ADMIN — SODIUM CHLORIDE 3 MILLILITER(S): 9 INJECTION INTRAMUSCULAR; INTRAVENOUS; SUBCUTANEOUS at 05:56

## 2019-06-12 RX ADMIN — CEFEPIME 100 MILLIGRAM(S): 1 INJECTION, POWDER, FOR SOLUTION INTRAMUSCULAR; INTRAVENOUS at 06:00

## 2019-06-12 RX ADMIN — PANTOPRAZOLE SODIUM 40 MILLIGRAM(S): 20 TABLET, DELAYED RELEASE ORAL at 06:00

## 2019-06-12 NOTE — DISCHARGE NOTE PROVIDER - NSDCFUADDAPPT_GEN_ALL_CORE_FT
Follow Do's and Don'ts of cardiac surgery instruction packet for care and activity at home.   Continue to take your prescribed medications as directed.   Follow up with Dr. Whiteside on at CTS office at [facility] on FRIday 6/21/19 at 10 AM, call [phone] to confirm appointment.  Follow up w/ your primary care doctor and your cardiologist in 2 weeks, call to schedule an appointment.   Follow up w/ Dr. Wallace / Vascular surgeon/ at his office in 1-2 weeks. Call [phone] to schedule and confirm your appointment.   Follow up w/ Dr. Ware, urologist, at his office in 1-2 weeks. Call [phone] to schedule an appointment.

## 2019-06-12 NOTE — PROGRESS NOTE ADULT - ASSESSMENT
54F Hx asthma and extensive smoking history presents with metastatic renal cancer that extends into the IVC, now s/p R radical nephrectomy with IVC tumor thrombectomy, primary closure of IVC, cardiopulmonary bypass and arrest (6/7/19)    - Doing well, continue to monitor for LE edema  - Rest of care per primary    Vascular Surgery  [id number]

## 2019-06-12 NOTE — DISCHARGE NOTE PROVIDER - PROVIDER TOKENS
PROVIDER:[TOKEN:[[id number]]],PROVIDER:[TOKEN:[[id number]]],PROVIDER:[TOKEN:[[id number]]]

## 2019-06-12 NOTE — DISCHARGE NOTE NURSING/CASE MANAGEMENT/SOCIAL WORK - NSDCDPATPORTLINK_GEN_ALL_CORE
You can access the [facility] Patient Portal, offered by [facility], by registering with the following website: [email or web address]

## 2019-06-12 NOTE — PROGRESS NOTE ADULT - SUBJECTIVE AND OBJECTIVE BOX
VASCULAR SURGERY DAILY PROGRESS NOTE:    Interval:  No acute events.    Subjective:  Patient seen and examined. Reports pain is well controlled. Tolerating diet.    Exam:  General Appearance: Appears well, NAD  Abdomen: Soft, nontender, appropriate incisional tenderness, dressings clean and dry and intact  Extremities: Grossly symmetric, no edema    Vital Signs Last 24 Hrs  T(C): 37 (2019 05:00), Max: 37.2 (2019 19:13)  T(F): 98.6 (2019 05:00), Max: 99 (2019 19:13)  HR: 109 (2019 05:00) (108 - 118)  BP: 133/90 (2019 05:00) (101/72 - 133/90)  BP(mean): --  RR: 18 (2019 05:00) (18 - 19)  SpO2: 97% (2019 05:00) (97% - 99%)    I&O's Detail    2019 07:01  -  2019 07:00  --------------------------------------------------------  IN:    IV PiggyBack: 50 mL    Oral Fluid: 1180 mL  Total IN: 1230 mL    OUT:    Voided: 1400 mL  Total OUT: 1400 mL    Total NET: -170 mL    Daily Weight in k.1 (2019 08:00)    MEDICATIONS  (STANDING):  aspirin enteric coated 81 milliGRAM(s) Oral daily  cefepime   IVPB      cefepime   IVPB 1000 milliGRAM(s) IV Intermittent every 12 hours  docusate sodium 100 milliGRAM(s) Oral three times a day  enoxaparin Injectable 40 milliGRAM(s) SubCutaneous daily  pantoprazole    Tablet 40 milliGRAM(s) Oral before breakfast  sodium chloride 0.9% lock flush 3 milliLiter(s) IV Push every 8 hours    MEDICATIONS  (PRN):  oxyCODONE    5 mG/acetaminophen 325 mG 1 Tablet(s) Oral every 6 hours PRN Moderate Pain (4 - 6)  oxyCODONE    5 mG/acetaminophen 325 mG 2 Tablet(s) Oral every 6 hours PRN Severe Pain (7 - 10)      LABS:                        9.8    16.55 )-----------( 326      ( 2019 08:51 )             30.8     06-11    140  |  104  |  11  ----------------------------<  84  3.7   |  23  |  1.13    Ca    8.7      2019 06:42

## 2019-06-12 NOTE — PROGRESS NOTE ADULT - SUBJECTIVE AND OBJECTIVE BOX
Subjective: Patient seen and examined. No new events except as noted.     SUBJECTIVE/ROS:  No chest pain, dyspnea, palpitation, or dizziness.       MEDICATIONS:  MEDICATIONS  (STANDING):  aspirin enteric coated 81 milliGRAM(s) Oral daily  docusate sodium 100 milliGRAM(s) Oral three times a day  enoxaparin Injectable 40 milliGRAM(s) SubCutaneous daily  pantoprazole    Tablet 40 milliGRAM(s) Oral before breakfast  sodium chloride 0.9% lock flush 3 milliLiter(s) IV Push every 8 hours      PHYSICAL EXAM:  T(C): 37 (06-12-19 @ 05:00), Max: 37.2 (06-11-19 @ 19:13)  HR: 109 (06-12-19 @ 05:00) (109 - 118)  BP: 133/90 (06-12-19 @ 05:00) (119/84 - 133/90)  RR: 18 (06-12-19 @ 05:00) (18 - 18)  SpO2: 97% (06-12-19 @ 05:00) (97% - 99%)  Wt(kg): --  I&O's Summary    11 Jun 2019 07:01  -  12 Jun 2019 07:00  --------------------------------------------------------  IN: 1230 mL / OUT: 1400 mL / NET: -170 mL    12 Jun 2019 07:01  -  12 Jun 2019 14:45  --------------------------------------------------------  IN: 600 mL / OUT: 650 mL / NET: -50 mL            JVP: Normal  Neck: supple  Lung: clear   CV: S1 S2 , Murmur:  Abd: soft  Ext: No edema  neuro: Awake / alert  Psych: flat affect  Skin: normal``    LABS/DATA:    CARDIAC MARKERS:                                10.0   12.5  )-----------( 422      ( 12 Jun 2019 06:55 )             30.8     06-12    141  |  107  |  8   ----------------------------<  91  4.3   |  24  |  1.08    Ca    9.0      12 Jun 2019 06:55      proBNP:   Lipid Profile:   HgA1c:   TSH:     TELE:  EKG:

## 2019-06-12 NOTE — DISCHARGE NOTE PROVIDER - NSDCCPCAREPLAN_GEN_ALL_CORE_FT
PRINCIPAL DISCHARGE DIAGNOSIS  Diagnosis: S/p nephrectomy  Assessment and Plan of Treatment: S/p nephrectomy

## 2019-06-12 NOTE — PROGRESS NOTE ADULT - ASSESSMENT
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

## 2019-06-12 NOTE — DISCHARGE NOTE PROVIDER - CARE PROVIDER_API CALL
Georgi Whiteside)  Thoracic and Cardiac Surgery  [address] [place] [address]  Phone: [phone]  Fax: [phone]  Follow Up Time:     Papo Ware)  Urology  [address], [address]  [place] [address]  Phone: [phone]  Fax: [phone]  Follow Up Time:     rFederic Gregorio)  Surgery; Surgical Critical Care; Vascular Surgery  [address], [address]  [place] [address]  Phone: [phone]  Fax: [phone]  Follow Up Time:

## 2019-06-12 NOTE — DISCHARGE NOTE PROVIDER - NSDCFUADDINST_GEN_ALL_CORE_FT
Follow Do's and Don'ts of cardiac surgery instruction packet for care and activity at home.   Continue to take your prescribed medications as directed.   Follow up with Dr. Whiteside on at CTS office at [facility] on FRIday 6/21/19 at 10 AM, call [phone] to confirm appointment.  Follow up w/ your primary care doctor and your cardiologist in 2 weeks, call to schedule an appointment.   Follow up w/ Dr. Wallace / Vascular surgeon/ at his office in 1-2 weeks. Call [phone] to schedule and confirm your appointment.   Follow up w/ Dr. Ware, urologist, at his office in 1-2 weeks. Call [phone] to schedule an appointment.

## 2019-06-12 NOTE — PROGRESS NOTE ADULT - PROVIDER SPECIALTY LIST ADULT
CT Surgery
CT Surgery
CTU
CTU
Cardiology
Critical Care
Critical Care
Pain Medicine
Urology
Vascular Surgery
Urology
Pain Medicine
CT Surgery

## 2019-06-12 NOTE — DISCHARGE NOTE PROVIDER - CARE PROVIDERS DIRECT ADDRESSES
[email or web address]

## 2019-06-12 NOTE — DISCHARGE NOTE PROVIDER - HOSPITAL COURSE
53 yo female with PMHx of Asthma, active Heavy smoking, recent Dx of Renal cell CA S/P 6/7/19 Radical R Nephrectomy, Sternotomy with IVC thrombectomy, requiring CPB & circulatory arrest POD #3    Intra op course complicated with bleeding requiring, 8 units PRBC transfusion, 2 units cryo. 2 platelets and 4 units FFP.     Post op course     Hypotension, requiring pressor support, volume resuscitation and invasive hemodynamic monitoring.     Acute Kidney Injury à Resolving     Extubated POD #1    6/10 febrile with rising WBCs à PAN Culture; BC x2, UA & UC . S/P 1 unit PRBC transfused this am for anemia, f/u Hct stable.  Transferred to 2 [facility] telemetry floor.     Disposition: Home PT once medically cleared     6/11 HD stable afebrile today.  BC no growth to date.  Urine culture pnd.  UA neg.    6/12 VSS, remains afebrile. IV abx d/c'd per Dr. Whiteside. BCx NGTD, Urine cx NGTD, UA neg. Discharge planning today. 55 yo female with PMHx of Asthma, active Heavy smoking, recent Dx of Renal cell CA S/P 6/7/19 Radical R Nephrectomy, Sternotomy with IVC thrombectomy, requiring CPB & circulatory arrest POD #3    Intra op course complicated with bleeding requiring, 8 units PRBC transfusion, 2 units cryo. 2 platelets and 4 units FFP.     Post op course     Hypotension, requiring pressor support, volume resuscitation and invasive hemodynamic monitoring.     Acute Kidney Injury à Resolving     Extubated POD #1    6/10 febrile with rising WBCs à PAN Culture; BC x2, UA & UC . S/P 1 unit PRBC transfused this am for anemia, f/u Hct stable.  [facility]     Disposition: Home PT once medically cleared     6/11 HD stable afebrile today.  BC no growth to date.  Urine culture pnd.  UA neg.    6/12 VSS, remains afebrile. IV abx d/c'd per Dr. Whiteside. BCx NGTD, Urine cx NGTD, UA neg. Discharge planning today.         Post discharge addendum:    Upon her transfer to [facility] the pt arrived with mild malnutrition.

## 2019-06-12 NOTE — PROGRESS NOTE ADULT - REASON FOR ADMISSION
9cm right renal mass, IVC tumor thrombus
s/p R nephrectomy
9cm right renal mass, IVC tumor thrombus
s/p R nephrectomy
9cm right renal mass
9cm right renal mass, IVC tumor thrombus

## 2019-06-12 NOTE — PROGRESS NOTE ADULT - ASSESSMENT
A/P: 54y Female s/p combined surgery June 7: R radical nephrectomy with IVC tumor thrombectomy, primary closure of IVC, cardiopulmonary bypass and circulatory arrest  - oob/ambulate  - labs  - cont abx 5 more days  - nutrition/ calorie count  - dc planning A/P: 54y Female s/p combined surgery June 7: R radical nephrectomy with IVC tumor thrombectomy, primary closure of IVC, cardiopulmonary bypass and circulatory arrest  - oob/ambulate  - labs  - cont abx 5 more days  - nutrition/ calorie count  - dc planning     Addendum: pt with negative cx and will be dcd without abx. May fu in 1-2 weeks.  Questioned with primary team pts tachycardia.  ? dvt. Is at high risk for dvt given malignancy hx and prolonged sx/ hospitalization.

## 2019-06-12 NOTE — PROGRESS NOTE ADULT - SUBJECTIVE AND OBJECTIVE BOX
UROLOGY DAILY PROGRESS NOTE:     Subjective: Patient seen and examined at bedside. No overnight events. Pain controlled, tolerating regular diet, +OOB  No BM.       Objective:  Vital signs  T(F): , Max: 99 (06-11-19 @ 19:13)  HR: 109 (06-12-19 @ 05:00)  BP: 133/90 (06-12-19 @ 05:00)  SpO2: 97% (06-12-19 @ 05:00)  Wt(kg): --    I&O's Summary    11 Jun 2019 07:01  -  12 Jun 2019 07:00  --------------------------------------------------------  IN: 1230 mL / OUT: 1400 mL / NET: -170 mL        Gen: NAD  Pulm: No respiratory distress, no subcostal retractions  CV: RRR, no JVD  Abd: Soft, NT, mildly distended, incisions CDI  Ext: no swelling     Labs:  06-11  16.55 / 30.8  /x      06-11  x     / x     /1.13                           9.8    16.55 )-----------( 326      ( 11 Jun 2019 08:51 )             30.8     06-11    140  |  104  |  11  ----------------------------<  84  3.7   |  23  |  1.13    Ca    8.7      11 Jun 2019 06:42          Urine Cx:  Culture - Urine (06.10.19 @ 23:04)    Specimen Source: .Urine    Culture Results:   No growth    Culture - Blood (06.10.19 @ 00:17)    Specimen Source: .Blood    Culture Results:   No growth to date.

## 2019-06-12 NOTE — DISCHARGE NOTE PROVIDER - NSDCPNSUBOBJ_GEN_ALL_CORE
Neurology: A&Ox3, nonfocal, no gross deficits    CV : RRR+S1S2    Sternal Wound: MSI CDI , Stable    Lungs: Respirations non-labored, B/L BS    Abdomen: Soft, NT/ND, +BSx4Q, last BM 6/11 +abd incision, dry intact, well approximated    : Voiding without difficulty    Extremities: B/L LE edema, negative calf tenderness, +PP           More than 30 mins spent on total encounter, patient counseling and discharge instructions.

## 2019-06-15 LAB
CULTURE RESULTS: SIGNIFICANT CHANGE UP
SPECIMEN SOURCE: SIGNIFICANT CHANGE UP

## 2019-06-17 LAB — SURGICAL PATHOLOGY STUDY: SIGNIFICANT CHANGE UP

## 2019-06-25 ENCOUNTER — APPOINTMENT (OUTPATIENT)
Dept: UROLOGY | Facility: CLINIC | Age: 54
End: 2019-06-25
Payer: MEDICAID

## 2019-06-25 VITALS
DIASTOLIC BLOOD PRESSURE: 96 MMHG | WEIGHT: 138 LBS | RESPIRATION RATE: 16 BRPM | SYSTOLIC BLOOD PRESSURE: 150 MMHG | HEART RATE: 87 BPM | TEMPERATURE: 99.1 F

## 2019-06-25 DIAGNOSIS — F17.200 NICOTINE DEPENDENCE, UNSPECIFIED, UNCOMPLICATED: ICD-10-CM

## 2019-06-25 DIAGNOSIS — Z87.09 PERSONAL HISTORY OF OTHER DISEASES OF THE RESPIRATORY SYSTEM: ICD-10-CM

## 2019-06-25 DIAGNOSIS — C64.1 MALIGNANT NEOPLASM OF RIGHT KIDNEY, EXCEPT RENAL PELVIS: ICD-10-CM

## 2019-06-25 PROCEDURE — 99024 POSTOP FOLLOW-UP VISIT: CPT

## 2019-06-26 PROBLEM — Z90.5 STATUS POST NEPHRECTOMY: Status: ACTIVE | Noted: 2019-06-26

## 2019-06-26 PROBLEM — Z09 POSTOP CHECK: Status: ACTIVE | Noted: 2019-06-26

## 2019-06-26 RX ORDER — SENNOSIDES 8.6 MG TABLETS 8.6 MG/1
8.6 TABLET ORAL
Qty: 90 | Refills: 0 | Status: ACTIVE | COMMUNITY
Start: 2019-06-26

## 2019-06-26 RX ORDER — ALBUTEROL SULFATE 90 UG/1
108 (90 BASE) AEROSOL, METERED RESPIRATORY (INHALATION)
Refills: 1 | Status: ACTIVE | COMMUNITY
Start: 2019-06-26

## 2019-06-26 RX ORDER — OXYCODONE HYDROCHLORIDE AND ACETAMINOPHEN 5; 325 MG/1; MG/1
5-325 TABLET ORAL
Refills: 0 | Status: ACTIVE | COMMUNITY
Start: 2019-06-26

## 2019-06-26 RX ORDER — PANTOPRAZOLE 40 MG/1
40 TABLET, DELAYED RELEASE ORAL DAILY
Qty: 30 | Refills: 0 | Status: ACTIVE | COMMUNITY
Start: 2019-06-26

## 2019-06-26 RX ORDER — ACETAMINOPHEN 325 MG/1
325 TABLET ORAL EVERY 6 HOURS
Refills: 0 | Status: ACTIVE | COMMUNITY
Start: 2019-06-26

## 2019-06-26 RX ORDER — DOCUSATE SODIUM 100 MG/1
100 CAPSULE, LIQUID FILLED ORAL 3 TIMES DAILY
Qty: 30 | Refills: 0 | Status: ACTIVE | COMMUNITY
Start: 2019-06-26

## 2019-06-26 RX ORDER — ONDANSETRON 4 MG/1
4 TABLET, ORALLY DISINTEGRATING ORAL
Qty: 84 | Refills: 0 | Status: ACTIVE | COMMUNITY
Start: 2019-06-26

## 2019-06-26 RX ORDER — NICOTINE TRANSDERMAL SYSTEM 21 MG/24H
21 PATCH, EXTENDED RELEASE TRANSDERMAL
Qty: 30 | Refills: 0 | Status: ACTIVE | COMMUNITY
Start: 2019-06-26

## 2019-06-26 RX ORDER — ASPIRIN ENTERIC COATED TABLETS 81 MG 81 MG/1
81 TABLET, DELAYED RELEASE ORAL
Qty: 90 | Refills: 3 | Status: ACTIVE | COMMUNITY
Start: 2019-06-26

## 2019-06-28 ENCOUNTER — APPOINTMENT (OUTPATIENT)
Dept: CARDIOTHORACIC SURGERY | Facility: CLINIC | Age: 54
End: 2019-06-28
Payer: MEDICAID

## 2019-06-28 VITALS
HEART RATE: 100 BPM | OXYGEN SATURATION: 99 % | BODY MASS INDEX: 24.98 KG/M2 | HEIGHT: 63 IN | DIASTOLIC BLOOD PRESSURE: 90 MMHG | RESPIRATION RATE: 13 BRPM | TEMPERATURE: 98.4 F | SYSTOLIC BLOOD PRESSURE: 128 MMHG | WEIGHT: 141 LBS

## 2019-06-28 DIAGNOSIS — Z90.5 ACQUIRED ABSENCE OF KIDNEY: ICD-10-CM

## 2019-06-28 DIAGNOSIS — Z09 ENCOUNTER FOR FOLLOW-UP EXAMINATION AFTER COMPLETED TREATMENT FOR CONDITIONS OTHER THAN MALIGNANT NEOPLASM: ICD-10-CM

## 2019-06-28 PROCEDURE — 99024 POSTOP FOLLOW-UP VISIT: CPT

## 2019-07-10 PROCEDURE — 93454 CORONARY ARTERY ANGIO S&I: CPT

## 2019-07-10 PROCEDURE — 99152 MOD SED SAME PHYS/QHP 5/>YRS: CPT

## 2019-07-10 PROCEDURE — 86901 BLOOD TYPING SEROLOGIC RH(D): CPT

## 2019-07-10 PROCEDURE — 82553 CREATINE MB FRACTION: CPT

## 2019-07-10 PROCEDURE — 82330 ASSAY OF CALCIUM: CPT

## 2019-07-10 PROCEDURE — 86900 BLOOD TYPING SEROLOGIC ABO: CPT

## 2019-07-10 PROCEDURE — 87086 URINE CULTURE/COLONY COUNT: CPT

## 2019-07-10 PROCEDURE — 86923 COMPATIBILITY TEST ELECTRIC: CPT

## 2019-07-10 PROCEDURE — 84443 ASSAY THYROID STIM HORMONE: CPT

## 2019-07-10 PROCEDURE — 82435 ASSAY OF BLOOD CHLORIDE: CPT

## 2019-07-10 PROCEDURE — P9041: CPT

## 2019-07-10 PROCEDURE — 85730 THROMBOPLASTIN TIME PARTIAL: CPT

## 2019-07-10 PROCEDURE — 36430 TRANSFUSION BLD/BLD COMPNT: CPT

## 2019-07-10 PROCEDURE — P9037: CPT

## 2019-07-10 PROCEDURE — 93005 ELECTROCARDIOGRAM TRACING: CPT

## 2019-07-10 PROCEDURE — 82803 BLOOD GASES ANY COMBINATION: CPT

## 2019-07-10 PROCEDURE — 84132 ASSAY OF SERUM POTASSIUM: CPT

## 2019-07-10 PROCEDURE — 84484 ASSAY OF TROPONIN QUANT: CPT

## 2019-07-10 PROCEDURE — C1894: CPT

## 2019-07-10 PROCEDURE — 83735 ASSAY OF MAGNESIUM: CPT

## 2019-07-10 PROCEDURE — 93306 TTE W/DOPPLER COMPLETE: CPT

## 2019-07-10 PROCEDURE — 71045 X-RAY EXAM CHEST 1 VIEW: CPT

## 2019-07-10 PROCEDURE — 74018 RADEX ABDOMEN 1 VIEW: CPT

## 2019-07-10 PROCEDURE — P9017: CPT

## 2019-07-10 PROCEDURE — C8902: CPT

## 2019-07-10 PROCEDURE — 83036 HEMOGLOBIN GLYCOSYLATED A1C: CPT

## 2019-07-10 PROCEDURE — 84295 ASSAY OF SERUM SODIUM: CPT

## 2019-07-10 PROCEDURE — C1769: CPT

## 2019-07-10 PROCEDURE — C1887: CPT

## 2019-07-10 PROCEDURE — 81001 URINALYSIS AUTO W/SCOPE: CPT

## 2019-07-10 PROCEDURE — 80048 BASIC METABOLIC PNL TOTAL CA: CPT

## 2019-07-10 PROCEDURE — 85610 PROTHROMBIN TIME: CPT

## 2019-07-10 PROCEDURE — A9585: CPT

## 2019-07-10 PROCEDURE — 86850 RBC ANTIBODY SCREEN: CPT

## 2019-07-10 PROCEDURE — P9011: CPT

## 2019-07-10 PROCEDURE — P9059: CPT

## 2019-07-10 PROCEDURE — 94002 VENT MGMT INPAT INIT DAY: CPT

## 2019-07-10 PROCEDURE — 84100 ASSAY OF PHOSPHORUS: CPT

## 2019-07-10 PROCEDURE — 82962 GLUCOSE BLOOD TEST: CPT

## 2019-07-10 PROCEDURE — 82947 ASSAY GLUCOSE BLOOD QUANT: CPT

## 2019-07-10 PROCEDURE — 83605 ASSAY OF LACTIC ACID: CPT

## 2019-07-10 PROCEDURE — 86891 AUTOLOGOUS BLOOD OP SALVAGE: CPT

## 2019-07-10 PROCEDURE — C1751: CPT

## 2019-07-10 PROCEDURE — 84145 PROCALCITONIN (PCT): CPT

## 2019-07-10 PROCEDURE — 97164 PT RE-EVAL EST PLAN CARE: CPT

## 2019-07-10 PROCEDURE — 97116 GAIT TRAINING THERAPY: CPT

## 2019-07-10 PROCEDURE — 88312 SPECIAL STAINS GROUP 1: CPT

## 2019-07-10 PROCEDURE — C1889: CPT

## 2019-07-10 PROCEDURE — 87640 STAPH A DNA AMP PROBE: CPT

## 2019-07-10 PROCEDURE — 85014 HEMATOCRIT: CPT

## 2019-07-10 PROCEDURE — P9012: CPT

## 2019-07-10 PROCEDURE — 87040 BLOOD CULTURE FOR BACTERIA: CPT

## 2019-07-10 PROCEDURE — 88307 TISSUE EXAM BY PATHOLOGIST: CPT

## 2019-07-10 PROCEDURE — 85027 COMPLETE CBC AUTOMATED: CPT

## 2019-07-10 PROCEDURE — 97161 PT EVAL LOW COMPLEX 20 MIN: CPT

## 2019-07-10 PROCEDURE — 80053 COMPREHEN METABOLIC PANEL: CPT

## 2019-07-10 PROCEDURE — 87641 MR-STAPH DNA AMP PROBE: CPT

## 2019-07-10 PROCEDURE — 82550 ASSAY OF CK (CPK): CPT

## 2019-07-10 PROCEDURE — 82565 ASSAY OF CREATININE: CPT

## 2019-07-10 PROCEDURE — P9047: CPT

## 2019-07-10 PROCEDURE — P9016: CPT

## 2019-07-23 ENCOUNTER — APPOINTMENT (OUTPATIENT)
Dept: VASCULAR SURGERY | Facility: CLINIC | Age: 54
End: 2019-07-23

## 2022-10-04 NOTE — PATIENT PROFILE ADULT - DO YOU FEEL LIKE HURTING YOURSELF OR OTHERS?
Physical Therapy   Date: 10/4/2022  Patient canceled late (per policy guidelines) today's (10/4/2022) scheduled appointment.  This is the patient's first no show/late cancel.      Patient canceled as her granddaughter tested positive for COVID-19.   
no

## 2023-02-11 NOTE — PRE-OP CHECKLIST - AICD PRESENT
Discharge Note     Patient is discharging on this date. Patient denies SI, HI, and AVH at this time. Patient reports improvement in behavior and is leaving unit in overall good condition. SW and patient discussed patient's follow up appointments and importance of attending appointments as scheduled, patient voiced understanding and agreement. Patient and SW also discussed patient's safety plan and patient was able to verbally identify: warning signs, coping strategies, places and people that help make the patient feel better/distract negative thoughts, friends/family/agencies/professionals the patient can reach out to in a crisis, and something that is important to the patient/worth living for. Patient was provided the national suicide prevention hotline number (9-234-491-157-515-6896) as well as local community behavioral health [facility]) crisis number for emergencies (2-575-573-378-896-3443). Discharge Disposition: home -lives with family      Pt to follow up with Fiorella Martinez on February 16, 2022 at 8:30 AM for the intake appointment with MARTELL Luevano. Patient will follow up with  Zaid Felton at [facility] on February14 , 2022 at 10:45 AM for the medication management appointment. Referral to outpatient tobacco cessation counseling treatment:  Patient refused referral to outpatient tobacco cessation counseling    SW offered to assist patient with transportation,  but her mother came to pick her up. no

## 2024-06-20 NOTE — ED PROVIDER NOTE - CONSULTING PHYSICIAN
[FreeTextEntry1] : Gen: Patient is A&O x 3, NAD HEENT: EOMI, hearing grossly normal Resp: regular, non - labored CV: pulses regular Skin: no rashes, erythema  Lymph: no clubbing, cyanosis, edema, or palpable lymphadenopathy Inspection: no instability or misalignment, no inguinal or testicular hernia  ROM: mildly decrease cervical rotation and extension, Early fibrosis of neck Palpation: non- Tender to touch cervical region  Sensation: decreased to light touch in feet  Reflexes: 1+ and symmetric throughout Strength: 5/5 throughout Gait: normal, non-antalgic  Urology